# Patient Record
Sex: MALE | Race: WHITE | Employment: UNEMPLOYED | URBAN - METROPOLITAN AREA
[De-identification: names, ages, dates, MRNs, and addresses within clinical notes are randomized per-mention and may not be internally consistent; named-entity substitution may affect disease eponyms.]

---

## 2017-01-13 RX ORDER — MELOXICAM 15 MG/1
15 TABLET ORAL DAILY
Qty: 30 TABLET | Refills: 0 | Status: SHIPPED | OUTPATIENT
Start: 2017-01-13 | End: 2017-02-05 | Stop reason: SDUPTHER

## 2017-02-16 ENCOUNTER — TELEPHONE (OUTPATIENT)
Dept: FAMILY MEDICINE CLINIC | Age: 42
End: 2017-02-16

## 2017-02-24 ENCOUNTER — OFFICE VISIT (OUTPATIENT)
Dept: FAMILY MEDICINE CLINIC | Age: 42
End: 2017-02-24

## 2017-02-24 VITALS
BODY MASS INDEX: 31.5 KG/M2 | SYSTOLIC BLOOD PRESSURE: 112 MMHG | HEART RATE: 93 BPM | WEIGHT: 196 LBS | HEIGHT: 66 IN | OXYGEN SATURATION: 98 % | DIASTOLIC BLOOD PRESSURE: 68 MMHG

## 2017-02-24 DIAGNOSIS — R06.83 SNORING: ICD-10-CM

## 2017-02-24 DIAGNOSIS — M51.36 DISC DEGENERATION, LUMBAR: Primary | ICD-10-CM

## 2017-02-24 PROCEDURE — 99214 OFFICE O/P EST MOD 30 MIN: CPT | Performed by: FAMILY MEDICINE

## 2017-03-28 ENCOUNTER — TELEPHONE (OUTPATIENT)
Dept: FAMILY MEDICINE CLINIC | Age: 42
End: 2017-03-28

## 2017-05-03 RX ORDER — MELOXICAM 15 MG/1
15 TABLET ORAL DAILY
Qty: 30 TABLET | Refills: 0 | Status: SHIPPED | OUTPATIENT
Start: 2017-05-03 | End: 2017-06-05 | Stop reason: SDUPTHER

## 2017-05-11 ENCOUNTER — OFFICE VISIT (OUTPATIENT)
Dept: FAMILY MEDICINE CLINIC | Age: 42
End: 2017-05-11

## 2017-05-11 VITALS
HEART RATE: 95 BPM | DIASTOLIC BLOOD PRESSURE: 84 MMHG | OXYGEN SATURATION: 98 % | WEIGHT: 185.2 LBS | HEIGHT: 66 IN | SYSTOLIC BLOOD PRESSURE: 130 MMHG | BODY MASS INDEX: 29.77 KG/M2

## 2017-05-11 DIAGNOSIS — R42 DIZZINESS: ICD-10-CM

## 2017-05-11 DIAGNOSIS — E78.2 MIXED HYPERLIPIDEMIA: ICD-10-CM

## 2017-05-11 DIAGNOSIS — Z11.4 SCREENING FOR HIV (HUMAN IMMUNODEFICIENCY VIRUS): ICD-10-CM

## 2017-05-11 DIAGNOSIS — M51.36 DISC DEGENERATION, LUMBAR: Primary | ICD-10-CM

## 2017-05-11 PROCEDURE — 99214 OFFICE O/P EST MOD 30 MIN: CPT | Performed by: FAMILY MEDICINE

## 2017-05-11 RX ORDER — PREGABALIN 75 MG/1
75 CAPSULE ORAL 2 TIMES DAILY
Qty: 60 CAPSULE | Refills: 0 | Status: SHIPPED | OUTPATIENT
Start: 2017-05-11 | End: 2017-06-13 | Stop reason: SDUPTHER

## 2017-05-15 DIAGNOSIS — R42 DIZZINESS: ICD-10-CM

## 2017-05-15 DIAGNOSIS — E78.2 MIXED HYPERLIPIDEMIA: ICD-10-CM

## 2017-05-15 LAB
A/G RATIO: 2.4 (ref 1.1–2.2)
ALBUMIN SERPL-MCNC: 5 G/DL (ref 3.4–5)
ALP BLD-CCNC: 64 U/L (ref 40–129)
ALT SERPL-CCNC: 18 U/L (ref 10–40)
ANION GAP SERPL CALCULATED.3IONS-SCNC: 14 MMOL/L (ref 3–16)
AST SERPL-CCNC: 18 U/L (ref 15–37)
BILIRUB SERPL-MCNC: 0.4 MG/DL (ref 0–1)
BUN BLDV-MCNC: 12 MG/DL (ref 7–20)
CALCIUM SERPL-MCNC: 10.1 MG/DL (ref 8.3–10.6)
CHLORIDE BLD-SCNC: 101 MMOL/L (ref 99–110)
CHOLESTEROL, TOTAL: 211 MG/DL (ref 0–199)
CO2: 28 MMOL/L (ref 21–32)
CREAT SERPL-MCNC: 0.7 MG/DL (ref 0.9–1.3)
GFR AFRICAN AMERICAN: >60
GFR NON-AFRICAN AMERICAN: >60
GLOBULIN: 2.1 G/DL
GLUCOSE BLD-MCNC: 110 MG/DL (ref 70–99)
HCT VFR BLD CALC: 44.6 % (ref 40.5–52.5)
HDLC SERPL-MCNC: 42 MG/DL (ref 40–60)
HEMOGLOBIN: 14.7 G/DL (ref 13.5–17.5)
LDL CHOLESTEROL CALCULATED: 113 MG/DL
MCH RBC QN AUTO: 29.4 PG (ref 26–34)
MCHC RBC AUTO-ENTMCNC: 32.9 G/DL (ref 31–36)
MCV RBC AUTO: 89.3 FL (ref 80–100)
PDW BLD-RTO: 14.1 % (ref 12.4–15.4)
PLATELET # BLD: 251 K/UL (ref 135–450)
PMV BLD AUTO: 8.1 FL (ref 5–10.5)
POTASSIUM SERPL-SCNC: 4.4 MMOL/L (ref 3.5–5.1)
RBC # BLD: 4.99 M/UL (ref 4.2–5.9)
SODIUM BLD-SCNC: 143 MMOL/L (ref 136–145)
TOTAL PROTEIN: 7.1 G/DL (ref 6.4–8.2)
TRIGL SERPL-MCNC: 280 MG/DL (ref 0–150)
TSH REFLEX: 1.37 UIU/ML (ref 0.27–4.2)
VLDLC SERPL CALC-MCNC: 56 MG/DL
WBC # BLD: 4.7 K/UL (ref 4–11)

## 2017-05-16 ENCOUNTER — TELEPHONE (OUTPATIENT)
Dept: FAMILY MEDICINE CLINIC | Age: 42
End: 2017-05-16

## 2017-05-16 LAB
ESTIMATED AVERAGE GLUCOSE: 116.9 MG/DL
HBA1C MFR BLD: 5.7 %

## 2017-05-17 LAB — HIV-1 AND HIV-2 ANTIBODIES: NEGATIVE

## 2017-06-06 RX ORDER — MELOXICAM 15 MG/1
15 TABLET ORAL DAILY
Qty: 30 TABLET | Refills: 0 | Status: SHIPPED | OUTPATIENT
Start: 2017-06-06 | End: 2017-07-04 | Stop reason: SDUPTHER

## 2017-06-29 ENCOUNTER — TELEPHONE (OUTPATIENT)
Dept: PULMONOLOGY | Age: 42
End: 2017-06-29

## 2017-07-03 RX ORDER — CYCLOBENZAPRINE HCL 10 MG
TABLET ORAL
Qty: 30 TABLET | Refills: 0 | Status: SHIPPED | OUTPATIENT
Start: 2017-07-03 | End: 2017-08-01 | Stop reason: SDUPTHER

## 2017-07-26 ENCOUNTER — TELEPHONE (OUTPATIENT)
Dept: FAMILY MEDICINE CLINIC | Age: 42
End: 2017-07-26

## 2017-08-01 RX ORDER — CYCLOBENZAPRINE HCL 10 MG
10 TABLET ORAL NIGHTLY PRN
Qty: 30 TABLET | Refills: 0 | Status: SHIPPED | OUTPATIENT
Start: 2017-08-01 | End: 2017-10-10 | Stop reason: SDUPTHER

## 2017-08-30 ENCOUNTER — HOSPITAL ENCOUNTER (OUTPATIENT)
Dept: PHYSICAL THERAPY | Age: 42
Discharge: HOME | End: 2017-08-31
Attending: FAMILY MEDICINE | Admitting: FAMILY MEDICINE

## 2017-09-15 ENCOUNTER — OFFICE VISIT (OUTPATIENT)
Dept: FAMILY MEDICINE CLINIC | Age: 42
End: 2017-09-15

## 2017-09-15 VITALS
BODY MASS INDEX: 30.86 KG/M2 | HEART RATE: 107 BPM | SYSTOLIC BLOOD PRESSURE: 130 MMHG | OXYGEN SATURATION: 98 % | WEIGHT: 192 LBS | DIASTOLIC BLOOD PRESSURE: 80 MMHG | HEIGHT: 66 IN

## 2017-09-15 DIAGNOSIS — M51.36 DISC DEGENERATION, LUMBAR: Primary | ICD-10-CM

## 2017-09-15 DIAGNOSIS — F98.8 ADD (ATTENTION DEFICIT DISORDER): ICD-10-CM

## 2017-09-15 PROCEDURE — 99214 OFFICE O/P EST MOD 30 MIN: CPT | Performed by: FAMILY MEDICINE

## 2017-09-15 RX ORDER — DEXTROAMPHETAMINE SACCHARATE, AMPHETAMINE ASPARTATE, DEXTROAMPHETAMINE SULFATE AND AMPHETAMINE SULFATE 5; 5; 5; 5 MG/1; MG/1; MG/1; MG/1
20 TABLET ORAL DAILY
Qty: 30 TABLET | Refills: 0 | Status: SHIPPED | OUTPATIENT
Start: 2017-09-15 | End: 2017-10-10 | Stop reason: SDUPTHER

## 2017-09-23 PROBLEM — F98.8 ADD (ATTENTION DEFICIT DISORDER): Status: ACTIVE | Noted: 2017-09-23

## 2017-09-23 ASSESSMENT — ENCOUNTER SYMPTOMS: RESPIRATORY NEGATIVE: 1

## 2017-10-10 NOTE — TELEPHONE ENCOUNTER
Adderall - 9/15/17  Flexeril - 8/1/17  Meloxicam - 7/5/17  Last Office Visit 9/15/17  No Pending Appointments

## 2017-10-11 RX ORDER — CYCLOBENZAPRINE HCL 10 MG
10 TABLET ORAL NIGHTLY PRN
Qty: 30 TABLET | Refills: 0 | Status: SHIPPED | OUTPATIENT
Start: 2017-10-11 | End: 2017-12-28 | Stop reason: SDUPTHER

## 2017-10-11 RX ORDER — DEXTROAMPHETAMINE SACCHARATE, AMPHETAMINE ASPARTATE, DEXTROAMPHETAMINE SULFATE AND AMPHETAMINE SULFATE 5; 5; 5; 5 MG/1; MG/1; MG/1; MG/1
20 TABLET ORAL DAILY
Qty: 30 TABLET | Refills: 0 | Status: SHIPPED | OUTPATIENT
Start: 2017-10-11 | End: 2017-11-13 | Stop reason: SDUPTHER

## 2017-10-11 RX ORDER — MELOXICAM 15 MG/1
15 TABLET ORAL DAILY
Qty: 30 TABLET | Refills: 2 | Status: SHIPPED | OUTPATIENT
Start: 2017-10-11 | End: 2017-11-05 | Stop reason: SDUPTHER

## 2017-11-06 RX ORDER — MELOXICAM 15 MG/1
15 TABLET ORAL DAILY
Qty: 90 TABLET | Refills: 2 | Status: SHIPPED | OUTPATIENT
Start: 2017-11-06 | End: 2018-03-22

## 2017-11-14 RX ORDER — DEXTROAMPHETAMINE SACCHARATE, AMPHETAMINE ASPARTATE, DEXTROAMPHETAMINE SULFATE AND AMPHETAMINE SULFATE 5; 5; 5; 5 MG/1; MG/1; MG/1; MG/1
20 TABLET ORAL DAILY
Qty: 30 TABLET | Refills: 0 | Status: SHIPPED | OUTPATIENT
Start: 2017-11-14 | End: 2017-12-28 | Stop reason: SDUPTHER

## 2017-12-28 RX ORDER — CYCLOBENZAPRINE HCL 10 MG
TABLET ORAL
Qty: 30 TABLET | Refills: 0 | Status: SHIPPED | OUTPATIENT
Start: 2017-12-28 | End: 2018-01-17

## 2017-12-28 RX ORDER — DEXTROAMPHETAMINE SACCHARATE, AMPHETAMINE ASPARTATE, DEXTROAMPHETAMINE SULFATE AND AMPHETAMINE SULFATE 5; 5; 5; 5 MG/1; MG/1; MG/1; MG/1
20 TABLET ORAL DAILY
Qty: 30 TABLET | Refills: 0 | Status: SHIPPED | OUTPATIENT
Start: 2017-12-28 | End: 2018-01-17

## 2018-01-17 ENCOUNTER — OFFICE VISIT (OUTPATIENT)
Dept: FAMILY MEDICINE CLINIC | Age: 43
End: 2018-01-17

## 2018-01-17 VITALS
HEIGHT: 66 IN | DIASTOLIC BLOOD PRESSURE: 84 MMHG | SYSTOLIC BLOOD PRESSURE: 128 MMHG | WEIGHT: 196.8 LBS | OXYGEN SATURATION: 98 % | HEART RATE: 93 BPM | BODY MASS INDEX: 31.63 KG/M2

## 2018-01-17 DIAGNOSIS — M51.36 DISC DEGENERATION, LUMBAR: ICD-10-CM

## 2018-01-17 DIAGNOSIS — F98.8 ATTENTION DEFICIT DISORDER, UNSPECIFIED HYPERACTIVITY PRESENCE: ICD-10-CM

## 2018-01-17 DIAGNOSIS — K27.9 H PYLORI ULCER: Primary | ICD-10-CM

## 2018-01-17 DIAGNOSIS — B96.81 H PYLORI ULCER: Primary | ICD-10-CM

## 2018-01-17 PROCEDURE — 99214 OFFICE O/P EST MOD 30 MIN: CPT | Performed by: FAMILY MEDICINE

## 2018-01-17 RX ORDER — DULOXETIN HYDROCHLORIDE 30 MG/1
30 CAPSULE, DELAYED RELEASE ORAL DAILY
Qty: 30 CAPSULE | Refills: 3 | Status: SHIPPED | OUTPATIENT
Start: 2018-01-17 | End: 2019-03-06

## 2018-01-17 RX ORDER — METHYLPHENIDATE HYDROCHLORIDE 27 MG/1
27 TABLET ORAL DAILY
Qty: 30 TABLET | Refills: 0 | Status: SHIPPED | OUTPATIENT
Start: 2018-01-17 | End: 2018-02-21

## 2018-01-17 RX ORDER — BACLOFEN 20 MG/1
20 TABLET ORAL NIGHTLY PRN
Qty: 30 TABLET | Refills: 0 | Status: SHIPPED | OUTPATIENT
Start: 2018-01-17 | End: 2018-02-10 | Stop reason: SDUPTHER

## 2018-01-17 RX ORDER — PANTOPRAZOLE SODIUM 40 MG/1
40 TABLET, DELAYED RELEASE ORAL DAILY
Qty: 30 TABLET | Refills: 2 | Status: SHIPPED | OUTPATIENT
Start: 2018-01-17 | End: 2018-04-15 | Stop reason: SDUPTHER

## 2018-01-17 NOTE — PROGRESS NOTES
Subjective:      Patient ID: Annie Jackson is a 36 y.o. male. HPI   Pt is a of 36 y.o. male comes in today with   Chief Complaint   Patient presents with    Medication Refill    Follow-up     Patient is here to f/u on back pain. Here for follow up back pain. Still having numbness in left toe. If he forgets meloxicam has severe back pain. Flexeril not really helping. Neck always painful. Flared a few weeks ago but back to baseline. 95% of the time it helps  In September started adderall. 1st few weeks didn't help but after that helped with energy but not focus. Recurrent acid reflux  Was a problem in the past.  Watches diet. Has woke up choking due to this. H/o acid reflux in the past.    Past Medical History:Reviewed  Medications:Reviewed. No Known Allergies   Social hx:Reviewed. History   Smoking Status    Never Smoker   Smokeless Tobacco    Never Used     Review of Systems   Not wt change. No dysthymia  Vitals:    01/17/18 0936   BP: 128/84   Pulse: 93   SpO2: 98%      Objective:   Physical Exam    Assessment:      1. H pylori ulcer  H. PYLORI ANTIGEN, STOOL   2. Attention deficit disorder, unspecified hyperactivity presence     3. Disc degeneration, lumbar            Plan:    1. Restarting PPI and stool test  2. Not doing well so trial of alternate med  3. Not well controlled   Replace flexeril with baclofen  If cymbalta well tolerated after a few weeks could increase to 2 lyrica at night. If that helps and no side effects can then go to 2 lyrica twice daily. A new prescription for a higher dose can be called in in that case.   Will try chiropractic

## 2018-01-17 NOTE — PATIENT INSTRUCTIONS
Replace flexeril with baclofen  If cymbalta well tolerated after a few weeks could increase to 2 lyrica at night. If that helps and no side effects can then go to 2 lyrica twice daily.   A new prescription for a higher dose can be called in in that case

## 2018-01-22 ENCOUNTER — TELEPHONE (OUTPATIENT)
Dept: FAMILY MEDICINE CLINIC | Age: 43
End: 2018-01-22

## 2018-01-22 NOTE — TELEPHONE ENCOUNTER
Pt called to check on the status of his FIT test that he dropped off at [facility] [phone].   Pt hasn't heard anything back from the hospital.

## 2018-02-12 RX ORDER — BACLOFEN 20 MG/1
TABLET ORAL
Qty: 30 TABLET | Refills: 0 | Status: SHIPPED | OUTPATIENT
Start: 2018-02-12 | End: 2018-02-21 | Stop reason: SDUPTHER

## 2018-02-21 ENCOUNTER — OFFICE VISIT (OUTPATIENT)
Dept: FAMILY MEDICINE CLINIC | Age: 43
End: 2018-02-21

## 2018-02-21 VITALS
HEIGHT: 66 IN | HEART RATE: 86 BPM | OXYGEN SATURATION: 98 % | SYSTOLIC BLOOD PRESSURE: 100 MMHG | DIASTOLIC BLOOD PRESSURE: 70 MMHG | BODY MASS INDEX: 30.22 KG/M2 | WEIGHT: 188 LBS

## 2018-02-21 DIAGNOSIS — E78.2 MIXED HYPERLIPIDEMIA: ICD-10-CM

## 2018-02-21 DIAGNOSIS — R73.9 ELEVATED BLOOD SUGAR: ICD-10-CM

## 2018-02-21 DIAGNOSIS — M25.50 ARTHRALGIA, UNSPECIFIED JOINT: ICD-10-CM

## 2018-02-21 DIAGNOSIS — R20.0 NUMBNESS OF TOES: ICD-10-CM

## 2018-02-21 DIAGNOSIS — M51.36 DISC DEGENERATION, LUMBAR: ICD-10-CM

## 2018-02-21 DIAGNOSIS — F98.8 ATTENTION DEFICIT DISORDER, UNSPECIFIED HYPERACTIVITY PRESENCE: Primary | ICD-10-CM

## 2018-02-21 PROCEDURE — 99214 OFFICE O/P EST MOD 30 MIN: CPT | Performed by: FAMILY MEDICINE

## 2018-02-21 RX ORDER — METHYLPHENIDATE HYDROCHLORIDE 36 MG/1
36 TABLET ORAL DAILY
Qty: 30 TABLET | Refills: 0 | Status: SHIPPED | OUTPATIENT
Start: 2018-02-21 | End: 2018-03-23 | Stop reason: SDUPTHER

## 2018-02-21 RX ORDER — PREGABALIN 75 MG/1
75 CAPSULE ORAL 3 TIMES DAILY
Qty: 90 CAPSULE | Refills: 2 | Status: SHIPPED | OUTPATIENT
Start: 2018-02-21 | End: 2018-03-22

## 2018-02-21 RX ORDER — BACLOFEN 20 MG/1
TABLET ORAL
Qty: 30 TABLET | Refills: 2 | Status: SHIPPED | OUTPATIENT
Start: 2018-02-21 | End: 2019-02-14

## 2018-02-21 ASSESSMENT — ENCOUNTER SYMPTOMS: RESPIRATORY NEGATIVE: 1

## 2018-02-21 NOTE — PROGRESS NOTES
IgG; Future  -     C-Reactive Protein Inflammatory; Future  -     Sedimentation Rate; Future  bloodwork to evaluate  Mixed hyperlipidemia  -     Comprehensive Metabolic Panel; Future  -     Lipid Panel; Future  bloodwork to reevaluate  Elevated blood sugar  -     Hemoglobin A1C; Future  a1c to see if stalbe  Other orders  -     methylphenidate (CONCERTA) 36 MG extended release tablet; Take 1 tablet by mouth daily for 30 days. -     pregabalin (LYRICA) 75 MG capsule; Take 1 capsule by mouth 3 times daily for 90 days.   -     baclofen (LIORESAL) 20 MG tablet; TAKE 1 TABLET BY MOUTH EVERY NIGHT AS NEEDED FOR BACK SPASM

## 2018-02-22 DIAGNOSIS — E78.2 MIXED HYPERLIPIDEMIA: ICD-10-CM

## 2018-02-22 DIAGNOSIS — M25.50 ARTHRALGIA, UNSPECIFIED JOINT: ICD-10-CM

## 2018-02-22 DIAGNOSIS — R73.9 ELEVATED BLOOD SUGAR: ICD-10-CM

## 2018-02-22 LAB
A/G RATIO: 2.4 (ref 1.1–2.2)
ALBUMIN SERPL-MCNC: 5 G/DL (ref 3.4–5)
ALP BLD-CCNC: 64 U/L (ref 40–129)
ALT SERPL-CCNC: 20 U/L (ref 10–40)
ANION GAP SERPL CALCULATED.3IONS-SCNC: 10 MMOL/L (ref 3–16)
AST SERPL-CCNC: 24 U/L (ref 15–37)
BILIRUB SERPL-MCNC: 0.6 MG/DL (ref 0–1)
BUN BLDV-MCNC: 12 MG/DL (ref 7–20)
C-REACTIVE PROTEIN: 2.4 MG/L (ref 0–5.1)
CALCIUM SERPL-MCNC: 10.1 MG/DL (ref 8.3–10.6)
CHLORIDE BLD-SCNC: 103 MMOL/L (ref 99–110)
CHOLESTEROL, TOTAL: 187 MG/DL (ref 0–199)
CO2: 31 MMOL/L (ref 21–32)
CREAT SERPL-MCNC: 0.9 MG/DL (ref 0.9–1.3)
ESTIMATED AVERAGE GLUCOSE: 111.2 MG/DL
GFR AFRICAN AMERICAN: >60
GFR NON-AFRICAN AMERICAN: >60
GLOBULIN: 2.1 G/DL
GLUCOSE BLD-MCNC: 118 MG/DL (ref 70–99)
HBA1C MFR BLD: 5.5 %
HDLC SERPL-MCNC: 56 MG/DL (ref 40–60)
LDL CHOLESTEROL CALCULATED: 117 MG/DL
POTASSIUM SERPL-SCNC: 5.2 MMOL/L (ref 3.5–5.1)
RHEUMATOID FACTOR: <10 IU/ML
SEDIMENTATION RATE, ERYTHROCYTE: 5 MM/HR (ref 0–15)
SODIUM BLD-SCNC: 144 MMOL/L (ref 136–145)
TOTAL PROTEIN: 7.1 G/DL (ref 6.4–8.2)
TRIGL SERPL-MCNC: 72 MG/DL (ref 0–150)
URIC ACID, SERUM: 7.3 MG/DL (ref 3.5–7.2)
VLDLC SERPL CALC-MCNC: 14 MG/DL

## 2018-02-24 LAB — CCP IGG ANTIBODIES: 6 UNITS (ref 0–19)

## 2018-03-22 ENCOUNTER — OFFICE VISIT (OUTPATIENT)
Dept: FAMILY MEDICINE CLINIC | Age: 43
End: 2018-03-22

## 2018-03-22 VITALS
HEART RATE: 118 BPM | WEIGHT: 191 LBS | SYSTOLIC BLOOD PRESSURE: 130 MMHG | OXYGEN SATURATION: 98 % | BODY MASS INDEX: 30.7 KG/M2 | HEIGHT: 66 IN | DIASTOLIC BLOOD PRESSURE: 84 MMHG

## 2018-03-22 DIAGNOSIS — M51.36 DISC DEGENERATION, LUMBAR: ICD-10-CM

## 2018-03-22 DIAGNOSIS — K21.00 GERD WITH ESOPHAGITIS: ICD-10-CM

## 2018-03-22 DIAGNOSIS — R06.83 SNORING: Primary | ICD-10-CM

## 2018-03-22 DIAGNOSIS — F98.8 ATTENTION DEFICIT DISORDER, UNSPECIFIED HYPERACTIVITY PRESENCE: ICD-10-CM

## 2018-03-22 PROCEDURE — 99214 OFFICE O/P EST MOD 30 MIN: CPT | Performed by: FAMILY MEDICINE

## 2018-03-22 RX ORDER — CELECOXIB 200 MG/1
200 CAPSULE ORAL 2 TIMES DAILY
Qty: 60 CAPSULE | Refills: 2 | Status: SHIPPED | OUTPATIENT
Start: 2018-03-22 | End: 2019-03-06

## 2018-03-23 RX ORDER — METHYLPHENIDATE HYDROCHLORIDE 36 MG/1
36 TABLET ORAL DAILY
Qty: 30 TABLET | Refills: 0 | Status: SHIPPED | OUTPATIENT
Start: 2018-03-23 | End: 2019-03-06

## 2018-03-23 NOTE — TELEPHONE ENCOUNTER
Concerta refill needed. Send to [address] [facility] in [place] on [address].

## 2018-03-23 NOTE — TELEPHONE ENCOUNTER
Pt called back to say he is totally out of meds and would like to know if they could fill the Rx today.

## 2018-03-26 ASSESSMENT — ENCOUNTER SYMPTOMS: GASTROINTESTINAL NEGATIVE: 1

## 2018-03-28 ENCOUNTER — TELEPHONE (OUTPATIENT)
Dept: FAMILY MEDICINE CLINIC | Age: 43
End: 2018-03-28

## 2018-04-16 RX ORDER — PANTOPRAZOLE SODIUM 40 MG/1
40 TABLET, DELAYED RELEASE ORAL DAILY
Qty: 30 TABLET | Refills: 2 | Status: SHIPPED | OUTPATIENT
Start: 2018-04-16 | End: 2018-09-08 | Stop reason: SDUPTHER

## 2018-09-10 RX ORDER — PANTOPRAZOLE SODIUM 40 MG/1
40 TABLET, DELAYED RELEASE ORAL DAILY
Qty: 30 TABLET | Refills: 3 | Status: SHIPPED | OUTPATIENT
Start: 2018-09-10 | End: 2019-03-06

## 2019-02-14 ENCOUNTER — OFFICE VISIT (OUTPATIENT)
Dept: FAMILY MEDICINE CLINIC | Age: 44
End: 2019-02-14
Payer: COMMERCIAL

## 2019-02-14 VITALS
WEIGHT: 198.2 LBS | DIASTOLIC BLOOD PRESSURE: 86 MMHG | SYSTOLIC BLOOD PRESSURE: 120 MMHG | BODY MASS INDEX: 31.85 KG/M2 | HEIGHT: 66 IN | HEART RATE: 84 BPM | OXYGEN SATURATION: 98 %

## 2019-02-14 DIAGNOSIS — R51.9 ACUTE INTRACTABLE HEADACHE, UNSPECIFIED HEADACHE TYPE: ICD-10-CM

## 2019-02-14 DIAGNOSIS — Z00.00 WELL ADULT EXAM: ICD-10-CM

## 2019-02-14 DIAGNOSIS — R06.83 SNORING: ICD-10-CM

## 2019-02-14 DIAGNOSIS — Z00.00 WELL ADULT EXAM: Primary | ICD-10-CM

## 2019-02-14 DIAGNOSIS — R73.03 PREDIABETES: ICD-10-CM

## 2019-02-14 DIAGNOSIS — M51.36 DISC DEGENERATION, LUMBAR: ICD-10-CM

## 2019-02-14 LAB
A/G RATIO: 2.1 (ref 1.1–2.2)
ALBUMIN SERPL-MCNC: 5 G/DL (ref 3.4–5)
ALP BLD-CCNC: 70 U/L (ref 40–129)
ALT SERPL-CCNC: 28 U/L (ref 10–40)
ANION GAP SERPL CALCULATED.3IONS-SCNC: 15 MMOL/L (ref 3–16)
AST SERPL-CCNC: 28 U/L (ref 15–37)
BILIRUB SERPL-MCNC: 0.6 MG/DL (ref 0–1)
BUN BLDV-MCNC: 12 MG/DL (ref 7–20)
CALCIUM SERPL-MCNC: 10.3 MG/DL (ref 8.3–10.6)
CHLORIDE BLD-SCNC: 105 MMOL/L (ref 99–110)
CHOLESTEROL, TOTAL: 226 MG/DL (ref 0–199)
CO2: 27 MMOL/L (ref 21–32)
CREAT SERPL-MCNC: 0.9 MG/DL (ref 0.9–1.3)
GFR AFRICAN AMERICAN: >60
GFR NON-AFRICAN AMERICAN: >60
GLOBULIN: 2.4 G/DL
GLUCOSE BLD-MCNC: 107 MG/DL (ref 70–99)
HDLC SERPL-MCNC: 56 MG/DL (ref 40–60)
LDL CHOLESTEROL CALCULATED: 145 MG/DL
POTASSIUM SERPL-SCNC: 4.7 MMOL/L (ref 3.5–5.1)
SODIUM BLD-SCNC: 147 MMOL/L (ref 136–145)
TOTAL PROTEIN: 7.4 G/DL (ref 6.4–8.2)
TRIGL SERPL-MCNC: 124 MG/DL (ref 0–150)
VLDLC SERPL CALC-MCNC: 25 MG/DL

## 2019-02-14 PROCEDURE — 99396 PREV VISIT EST AGE 40-64: CPT | Performed by: FAMILY MEDICINE

## 2019-02-14 ASSESSMENT — PATIENT HEALTH QUESTIONNAIRE - PHQ9
1. LITTLE INTEREST OR PLEASURE IN DOING THINGS: 0
SUM OF ALL RESPONSES TO PHQ QUESTIONS 1-9: 0
SUM OF ALL RESPONSES TO PHQ QUESTIONS 1-9: 0
2. FEELING DOWN, DEPRESSED OR HOPELESS: 0
SUM OF ALL RESPONSES TO PHQ9 QUESTIONS 1 & 2: 0

## 2019-02-15 ENCOUNTER — TELEPHONE (OUTPATIENT)
Dept: FAMILY MEDICINE CLINIC | Age: 44
End: 2019-02-15

## 2019-02-15 LAB
ESTIMATED AVERAGE GLUCOSE: 116.9 MG/DL
HBA1C MFR BLD: 5.7 %

## 2019-03-06 ENCOUNTER — OFFICE VISIT (OUTPATIENT)
Dept: FAMILY MEDICINE CLINIC | Age: 44
End: 2019-03-06
Payer: COMMERCIAL

## 2019-03-06 VITALS
HEIGHT: 66 IN | RESPIRATION RATE: 12 BRPM | SYSTOLIC BLOOD PRESSURE: 124 MMHG | OXYGEN SATURATION: 97 % | BODY MASS INDEX: 31.76 KG/M2 | WEIGHT: 197.6 LBS | DIASTOLIC BLOOD PRESSURE: 84 MMHG | HEART RATE: 87 BPM

## 2019-03-06 DIAGNOSIS — M51.36 DISC DEGENERATION, LUMBAR: ICD-10-CM

## 2019-03-06 DIAGNOSIS — J39.2 THORNWALDT'S CYST: ICD-10-CM

## 2019-03-06 DIAGNOSIS — M10.9 GOUTY ARTHRITIS OF RIGHT GREAT TOE: Primary | Chronic | ICD-10-CM

## 2019-03-06 PROCEDURE — 99214 OFFICE O/P EST MOD 30 MIN: CPT | Performed by: FAMILY MEDICINE

## 2019-03-06 RX ORDER — COLCHICINE 0.6 MG/1
TABLET ORAL
Qty: 30 TABLET | Refills: 3 | Status: SHIPPED | OUTPATIENT
Start: 2019-03-06 | End: 2019-05-11 | Stop reason: SDUPTHER

## 2019-03-07 ENCOUNTER — TELEPHONE (OUTPATIENT)
Dept: ENT CLINIC | Age: 44
End: 2019-03-07

## 2019-03-07 ENCOUNTER — OFFICE VISIT (OUTPATIENT)
Dept: ENT CLINIC | Age: 44
End: 2019-03-07
Payer: COMMERCIAL

## 2019-03-07 VITALS — SYSTOLIC BLOOD PRESSURE: 137 MMHG | HEART RATE: 81 BPM | DIASTOLIC BLOOD PRESSURE: 94 MMHG

## 2019-03-07 DIAGNOSIS — J34.3 NASAL TURBINATE HYPERTROPHY: ICD-10-CM

## 2019-03-07 DIAGNOSIS — J34.89 NASAL OBSTRUCTION: ICD-10-CM

## 2019-03-07 DIAGNOSIS — J39.2 THORNWALDT'S CYST: Primary | ICD-10-CM

## 2019-03-07 DIAGNOSIS — J34.2 DEVIATED NASAL SEPTUM: ICD-10-CM

## 2019-03-07 DIAGNOSIS — K21.9 GASTROESOPHAGEAL REFLUX DISEASE, ESOPHAGITIS PRESENCE NOT SPECIFIED: ICD-10-CM

## 2019-03-07 PROCEDURE — 92511 NASOPHARYNGOSCOPY: CPT | Performed by: OTOLARYNGOLOGY

## 2019-03-07 PROCEDURE — 99214 OFFICE O/P EST MOD 30 MIN: CPT | Performed by: OTOLARYNGOLOGY

## 2019-03-07 RX ORDER — MOMETASONE FUROATE 50 UG/1
2 SPRAY, METERED NASAL DAILY
Qty: 1 INHALER | Refills: 3 | Status: SHIPPED | OUTPATIENT
Start: 2019-03-07 | End: 2019-03-07

## 2019-03-07 RX ORDER — AZELASTINE 1 MG/ML
2 SPRAY, METERED NASAL 2 TIMES DAILY
Qty: 4 BOTTLE | Refills: 1 | Status: SHIPPED | OUTPATIENT
Start: 2019-03-07 | End: 2019-03-07

## 2019-03-07 ASSESSMENT — ENCOUNTER SYMPTOMS
SINUS PRESSURE: 0
DIARRHEA: 0
SINUS PAIN: 0
COUGH: 0
CHEST TIGHTNESS: 0
VOICE CHANGE: 0
SORE THROAT: 0
RHINORRHEA: 0
CHOKING: 0
FACIAL SWELLING: 0
CONSTIPATION: 0
APNEA: 0
WHEEZING: 0
BACK PAIN: 0
SHORTNESS OF BREATH: 0
EYE DISCHARGE: 0
NAUSEA: 0
TROUBLE SWALLOWING: 0
VOMITING: 0
BLOOD IN STOOL: 0
EYE PAIN: 0
STRIDOR: 0
COLOR CHANGE: 0

## 2019-05-13 RX ORDER — COLCHICINE 0.6 MG/1
TABLET ORAL
Qty: 30 TABLET | Refills: 0 | Status: SHIPPED | OUTPATIENT
Start: 2019-05-13 | End: 2019-05-18 | Stop reason: SDUPTHER

## 2019-05-13 NOTE — TELEPHONE ENCOUNTER
Requested Prescriptions     Pending Prescriptions Disp Refills    colchicine (COLCRYS) 0.6 MG tablet [Pharmacy Med Name: COLCRYS 0.6MG TABLETS] 30 tablet 0     Sig: TAKE 2 TABLETS BY MOUTH ONCE THEN TAKE 1 TABLET BY MOUTH 1 HOUR LATER, THEN TAKE 1 TABLET BY MOUTH TWICE DAILY AS NEEDED FOR GOUT     LV: 03/6/2019  NV: none scheduled

## 2019-05-20 RX ORDER — COLCHICINE 0.6 MG/1
TABLET ORAL
Qty: 30 TABLET | Refills: 0 | Status: SHIPPED | OUTPATIENT
Start: 2019-05-20 | End: 2019-10-24 | Stop reason: SDUPTHER

## 2019-07-10 ENCOUNTER — TELEPHONE (OUTPATIENT)
Dept: FAMILY MEDICINE CLINIC | Age: 44
End: 2019-07-10

## 2019-07-10 ENCOUNTER — OFFICE VISIT (OUTPATIENT)
Dept: FAMILY MEDICINE CLINIC | Age: 44
End: 2019-07-10
Payer: COMMERCIAL

## 2019-07-10 VITALS
OXYGEN SATURATION: 99 % | HEART RATE: 90 BPM | DIASTOLIC BLOOD PRESSURE: 72 MMHG | SYSTOLIC BLOOD PRESSURE: 112 MMHG | HEIGHT: 66 IN | WEIGHT: 196.8 LBS | RESPIRATION RATE: 12 BRPM | BODY MASS INDEX: 31.63 KG/M2

## 2019-07-10 DIAGNOSIS — Z99.89 OSA ON CPAP: ICD-10-CM

## 2019-07-10 DIAGNOSIS — R53.83 FATIGUE, UNSPECIFIED TYPE: Primary | ICD-10-CM

## 2019-07-10 DIAGNOSIS — G47.33 OSA ON CPAP: ICD-10-CM

## 2019-07-10 DIAGNOSIS — R53.83 FATIGUE, UNSPECIFIED TYPE: ICD-10-CM

## 2019-07-10 LAB
HCT VFR BLD CALC: 46 % (ref 40.5–52.5)
HEMOGLOBIN: 15.3 G/DL (ref 13.5–17.5)
MCH RBC QN AUTO: 30.5 PG (ref 26–34)
MCHC RBC AUTO-ENTMCNC: 33.1 G/DL (ref 31–36)
MCV RBC AUTO: 91.9 FL (ref 80–100)
PDW BLD-RTO: 14.3 % (ref 12.4–15.4)
PLATELET # BLD: 283 K/UL (ref 135–450)
PMV BLD AUTO: 8.4 FL (ref 5–10.5)
RBC # BLD: 5.01 M/UL (ref 4.2–5.9)
TSH REFLEX: 1.8 UIU/ML (ref 0.27–4.2)
WBC # BLD: 7.8 K/UL (ref 4–11)

## 2019-07-10 PROCEDURE — 99213 OFFICE O/P EST LOW 20 MIN: CPT | Performed by: FAMILY MEDICINE

## 2019-07-10 RX ORDER — BACLOFEN 20 MG/1
20 TABLET ORAL NIGHTLY
Qty: 10 TABLET | Refills: 0 | Status: SHIPPED | OUTPATIENT
Start: 2019-07-10 | End: 2019-07-16

## 2019-07-16 RX ORDER — CYCLOBENZAPRINE HCL 10 MG
10 TABLET ORAL NIGHTLY PRN
Qty: 10 TABLET | Refills: 0 | Status: SHIPPED | OUTPATIENT
Start: 2019-07-16 | End: 2019-07-26

## 2019-07-16 NOTE — TELEPHONE ENCOUNTER
Last OV 7/10/19    Plan:   1. bloodwork to evaluate  Can retry muscle relaxant hs; hopefully will help decrease neck pain so he can sleep  If not effective consider flexeril or TCA  2. Compliant.  follow up with pulmonology as scheduled

## 2019-07-16 NOTE — TELEPHONE ENCOUNTER
I sent over a few flexeril. If that does not help we'll try elavil.   Blood counts and thyroid were normal

## 2019-07-18 ENCOUNTER — TELEPHONE (OUTPATIENT)
Dept: FAMILY MEDICINE CLINIC | Age: 44
End: 2019-07-18

## 2019-07-18 RX ORDER — AMITRIPTYLINE HYDROCHLORIDE 50 MG/1
50 TABLET, FILM COATED ORAL NIGHTLY
Qty: 30 TABLET | Refills: 0 | Status: SHIPPED | OUTPATIENT
Start: 2019-07-18 | End: 2019-07-26 | Stop reason: SINTOL

## 2019-07-18 NOTE — TELEPHONE ENCOUNTER
Regarding the cyclobenzaprine (FLEXERIL) 10 MG tablets, patient wanted to let us know that it is now working. Please call patient to discuss.

## 2019-07-19 ENCOUNTER — OFFICE VISIT (OUTPATIENT)
Dept: FAMILY MEDICINE CLINIC | Age: 44
End: 2019-07-19
Payer: COMMERCIAL

## 2019-07-19 VITALS
DIASTOLIC BLOOD PRESSURE: 72 MMHG | WEIGHT: 198 LBS | BODY MASS INDEX: 31.82 KG/M2 | HEART RATE: 106 BPM | RESPIRATION RATE: 12 BRPM | HEIGHT: 66 IN | SYSTOLIC BLOOD PRESSURE: 112 MMHG | OXYGEN SATURATION: 98 %

## 2019-07-19 DIAGNOSIS — M50.30 OTHER CERVICAL DISC DEGENERATION, UNSPECIFIED CERVICAL REGION: Primary | ICD-10-CM

## 2019-07-19 PROCEDURE — 99213 OFFICE O/P EST LOW 20 MIN: CPT | Performed by: FAMILY MEDICINE

## 2019-07-19 NOTE — PROGRESS NOTES
Subjective:      Patient ID: Dirk Harris is a 38 y.o. male. HPI   Pt is a of 38 y.o. male comes in today with   Chief Complaint   Patient presents with    Neck Pain     Doing chiropractic and not getting better. Helps temporarily. Recent MRI showed worsening cervical disc disease  Low back pain as well but neck supercedes that. Injections have not helped in the past.  physical therapy was making it worse. Compliant with cpap but not sleeping since he keeps waking up. Has not tried elavil yet. Flexeril and baclofen did not help    Headaches have improved however. No Known Allergies    Review of Systems   Constitutional: Negative. Objective:   Physical Exam   Constitutional: He is oriented to person, place, and time. He appears well-developed and well-nourished. No distress. HENT:   Head: Normocephalic and atraumatic. Eyes: No scleral icterus. Neurological: He is alert and oriented to person, place, and time. No cranial nerve deficit. Skin: Skin is warm and dry. He is not diaphoretic. Psychiatric: He has a normal mood and affect. His behavior is normal. Judgment and thought content normal.       Assessment:       Diagnosis Orders   1.  Other cervical disc degeneration, unspecified cervical region  Martha Nobles MD, Spine Surgery, [facility] 46:      Pain not well controlled  If tca does not help can try lunesta for sleep since compliant with cpap  Referred to evaluate for other pain relief modalities since declines request to repeat physical therapy         Connor Ferrari MD

## 2019-07-26 ENCOUNTER — TELEPHONE (OUTPATIENT)
Dept: FAMILY MEDICINE CLINIC | Age: 44
End: 2019-07-26

## 2019-07-26 DIAGNOSIS — Z99.89 OSA ON CPAP: Primary | ICD-10-CM

## 2019-07-26 DIAGNOSIS — G47.9 DIFFICULTY SLEEPING: ICD-10-CM

## 2019-07-26 DIAGNOSIS — G47.33 OSA ON CPAP: Primary | ICD-10-CM

## 2019-07-26 RX ORDER — ESZOPICLONE 1 MG/1
1 TABLET, FILM COATED ORAL NIGHTLY
Qty: 30 TABLET | Refills: 0 | Status: SHIPPED | OUTPATIENT
Start: 2019-07-26 | End: 2019-08-25

## 2019-07-30 ENCOUNTER — OFFICE VISIT (OUTPATIENT)
Dept: FAMILY MEDICINE CLINIC | Age: 44
End: 2019-07-30
Payer: COMMERCIAL

## 2019-07-30 VITALS
SYSTOLIC BLOOD PRESSURE: 126 MMHG | HEART RATE: 57 BPM | BODY MASS INDEX: 32.14 KG/M2 | HEIGHT: 66 IN | WEIGHT: 200 LBS | DIASTOLIC BLOOD PRESSURE: 88 MMHG | OXYGEN SATURATION: 99 %

## 2019-07-30 DIAGNOSIS — M50.30 OTHER CERVICAL DISC DEGENERATION, UNSPECIFIED CERVICAL REGION: Primary | ICD-10-CM

## 2019-07-30 DIAGNOSIS — Z99.89 OSA ON CPAP: ICD-10-CM

## 2019-07-30 DIAGNOSIS — G47.9 DIFFICULTY SLEEPING: ICD-10-CM

## 2019-07-30 DIAGNOSIS — M51.36 DISC DEGENERATION, LUMBAR: ICD-10-CM

## 2019-07-30 DIAGNOSIS — R41.840 DIFFICULTY CONCENTRATING: ICD-10-CM

## 2019-07-30 DIAGNOSIS — G47.33 OSA ON CPAP: ICD-10-CM

## 2019-07-30 PROCEDURE — 99214 OFFICE O/P EST MOD 30 MIN: CPT | Performed by: NURSE PRACTITIONER

## 2019-07-30 ASSESSMENT — ENCOUNTER SYMPTOMS
SHORTNESS OF BREATH: 0
ABDOMINAL PAIN: 0
CONSTIPATION: 0
BACK PAIN: 1
DIARRHEA: 0

## 2019-08-05 ENCOUNTER — OFFICE VISIT (OUTPATIENT)
Dept: ORTHOPEDIC SURGERY | Age: 44
End: 2019-08-05
Payer: COMMERCIAL

## 2019-08-05 VITALS
DIASTOLIC BLOOD PRESSURE: 83 MMHG | SYSTOLIC BLOOD PRESSURE: 117 MMHG | WEIGHT: 190 LBS | BODY MASS INDEX: 29.82 KG/M2 | RESPIRATION RATE: 16 BRPM | HEART RATE: 82 BPM | HEIGHT: 67 IN

## 2019-08-05 DIAGNOSIS — G89.4 CHRONIC PAIN SYNDROME: ICD-10-CM

## 2019-08-05 DIAGNOSIS — M47.812 CERVICAL SPONDYLOSIS WITHOUT MYELOPATHY: ICD-10-CM

## 2019-08-05 DIAGNOSIS — M54.12 CERVICAL RADICULOPATHY: ICD-10-CM

## 2019-08-05 DIAGNOSIS — M50.00 HNP (HERNIATED NUCLEUS PULPOSUS) WITH MYELOPATHY, CERVICAL: Primary | ICD-10-CM

## 2019-08-05 PROBLEM — G25.81 RESTLESS LEGS SYNDROME (RLS): Status: ACTIVE | Noted: 2019-02-15

## 2019-08-05 PROBLEM — G47.33 OBSTRUCTIVE SLEEP APNEA (ADULT) (PEDIATRIC): Status: ACTIVE | Noted: 2019-08-05

## 2019-08-05 PROBLEM — R06.83 SNORING: Status: ACTIVE | Noted: 2019-02-15

## 2019-08-05 PROCEDURE — 99244 OFF/OP CNSLTJ NEW/EST MOD 40: CPT | Performed by: PHYSICIAN ASSISTANT

## 2019-08-05 NOTE — PROGRESS NOTES
lesions. · Reflexes: Bilaterally triceps, biceps and brachioradialis are 1+. Clonus absent bilaterally at the feet. No pathological reflexes are noted. · Gait & station:  normal, patient ambulates without assistance and no ataxia  · Additional Examinations:  · RIGHT UPPER EXTREMITY:  Inspection/examination of the right upper extremity does not show any tenderness, deformity or injury. Range of motion is normal and pain-free. There is no gross instability. There are no rashes, ulcerations or lesions. Strength and tone are normal. No atrophy or abnormal movements are noted. · LEFT UPPER EXTREMITY: Inspection/examination of the left upper extremity does not show any tenderness, deformity or injury. Range of motion is normal and pain-free. There is no gross instability. There are no rashes, ulcerations or lesions. Strength and tone are normal. No atrophy or abnormal movements are noted. LUMBAR/SACRAL EXAMINATION:  · Inspection: Local inspection shows no step-off or bruising. Lumbar alignment is normal. No instability is noted. · Palpation:   No evidence of tenderness at the midline. Lumbar paraspinal tenderness Mild L4/5 and L5/S1 tenderness  Bursal tenderness No tenderness bilaterally  There is no paraspinal spasm. · Range of Motion: limited by 25% in all planes due to pain  · Strength:   Strength testing is 5/5 in all muscle groups tested. · Special Tests:   Straight leg raise and crossed SLR negative. Desmond's testing is negative bilaterally. FADIR's testing is negative bilaterally. · Skin: There are no rashes, ulcerations or lesions. · Reflexes: Reflexes are symmetrically 2+ at the patellar and ankle tendons. Clonus absent bilaterally at the feet. · Gait & station: normal, patient ambulates without assistance and no ataxia  · Additional Examinations:  · RIGHT LOWER EXTREMITY: Inspection/examination of the right lower extremity does not show any tenderness, deformity or injury.  Range of motion is unremarkable. There is no gross instability. There are no rashes, ulcerations or lesions. Strength and tone are normal. No atrophy or abnormal movements are noted. · LEFT LOWER EXTREMITY:  Inspection/examination of the left lower extremity does not show any tenderness, deformity or injury. Range of motion is unremarkable. There is no gross instability. There are no rashes, ulcerations or lesions. Strength and tone are normal. No atrophy or abnormal movements are noted. Diagnostic Testing:    Xrays:   None  MRI or CT:  MRI CERVICAL SPINE 03/12/2019   CONCLUSION:  A broad spondylotic C5-6 disc displacement with a superimposed small central disc protrusion encroaches  upon the ventral cord and the exiting C6 nerve root sheaths. Mild to moderate bilateral foraminal stenosis  secondary to Luschka joint hypertrophy and facet hypertrophy with C6 root effacement. Moderate left C3-4 foraminal stenosis secondary to facet and Luschka joint hypertrophy with C4 root  effacement. Straightening and reversal of the cervical lordosis, retrolisthetic microinstability at C5-6 and multilevel facet  and Luschka joint hypertrophy may all contribute to the patient's history of cervical pain. EMG:  None  Results for orders placed or performed in visit on 07/10/19   TSH with Reflex   Result Value Ref Range    TSH 1.80 0.27 - 4.20 uIU/mL   CBC   Result Value Ref Range    WBC 7.8 4.0 - 11.0 K/uL    RBC 5.01 4.20 - 5.90 M/uL    Hemoglobin 15.3 13.5 - 17.5 g/dL    Hematocrit 46.0 40.5 - 52.5 %    MCV 91.9 80.0 - 100.0 fL    MCH 30.5 26.0 - 34.0 pg    MCHC 33.1 31.0 - 36.0 g/dL    RDW 14.3 12.4 - 15.4 %    Platelets 916 927 - 428 K/uL    MPV 8.4 5.0 - 10.5 fL       Impression (Medical Decision Making):       1. HNP (herniated nucleus pulposus) with myelopathy, cervical    2. Cervical radiculopathy    3. Cervical spondylosis without myelopathy    4.  Chronic pain syndrome        Plan (Medical Decision Making):    I discussed the instructed to contact the office between now & his scheduled appointment if he has concerns related to his condition or if he needs assistance in scheduling the above tests. He is welcome to call for an appointment sooner if he has any additional concerns or questions. Mina Hale CRMA am scribing for and in the presence of Joe Qureshi. The physical examination was performed between the patient and JHONNY Qureshi. All counseling during the appointment was performed between the patient and provider. Obdulio Hernandez PA-C, personally performed the services described in this documentation as scribed by Sarwat Quach CRMA in my presence and it is both accurate and complete. OBINNA Camp PA-C  Board Certified by the [employer] on Certification of Physician Assistants  [address] [facility]  Partner of [facility] ([facility])       This dictation was performed with a verbal recognition program [facility]) and it was checked for errors. It is possible that there are still dictated errors within this office note. If so, please bring any errors to my attention for an addendum. All efforts were made to ensure that this office note is accurate.

## 2019-08-07 ENCOUNTER — OFFICE VISIT (OUTPATIENT)
Dept: FAMILY MEDICINE CLINIC | Age: 44
End: 2019-08-07
Payer: COMMERCIAL

## 2019-08-07 VITALS
OXYGEN SATURATION: 98 % | SYSTOLIC BLOOD PRESSURE: 122 MMHG | BODY MASS INDEX: 31.42 KG/M2 | HEART RATE: 76 BPM | WEIGHT: 200.2 LBS | DIASTOLIC BLOOD PRESSURE: 78 MMHG | HEIGHT: 67 IN

## 2019-08-07 DIAGNOSIS — Z99.89 OSA ON CPAP: ICD-10-CM

## 2019-08-07 DIAGNOSIS — K21.9 GASTROESOPHAGEAL REFLUX DISEASE, ESOPHAGITIS PRESENCE NOT SPECIFIED: ICD-10-CM

## 2019-08-07 DIAGNOSIS — Z13.31 POSITIVE DEPRESSION SCREENING: ICD-10-CM

## 2019-08-07 DIAGNOSIS — M50.30 OTHER CERVICAL DISC DEGENERATION, UNSPECIFIED CERVICAL REGION: Primary | ICD-10-CM

## 2019-08-07 DIAGNOSIS — R41.840 DIFFICULTY CONCENTRATING: ICD-10-CM

## 2019-08-07 DIAGNOSIS — G47.9 DIFFICULTY SLEEPING: ICD-10-CM

## 2019-08-07 DIAGNOSIS — G47.33 OSA ON CPAP: ICD-10-CM

## 2019-08-07 PROCEDURE — 99214 OFFICE O/P EST MOD 30 MIN: CPT | Performed by: NURSE PRACTITIONER

## 2019-08-07 PROCEDURE — G8431 POS CLIN DEPRES SCRN F/U DOC: HCPCS | Performed by: NURSE PRACTITIONER

## 2019-08-07 PROCEDURE — G0444 DEPRESSION SCREEN ANNUAL: HCPCS | Performed by: NURSE PRACTITIONER

## 2019-08-07 RX ORDER — PANTOPRAZOLE SODIUM 40 MG/1
40 TABLET, DELAYED RELEASE ORAL DAILY
Qty: 30 TABLET | Refills: 3 | Status: SHIPPED | OUTPATIENT
Start: 2019-08-07 | End: 2020-02-17

## 2019-08-07 ASSESSMENT — PATIENT HEALTH QUESTIONNAIRE - PHQ9
3. TROUBLE FALLING OR STAYING ASLEEP: 3
7. TROUBLE CONCENTRATING ON THINGS, SUCH AS READING THE NEWSPAPER OR WATCHING TELEVISION: 3
6. FEELING BAD ABOUT YOURSELF - OR THAT YOU ARE A FAILURE OR HAVE LET YOURSELF OR YOUR FAMILY DOWN: 1
5. POOR APPETITE OR OVEREATING: 3
SUM OF ALL RESPONSES TO PHQ9 QUESTIONS 1 & 2: 4
4. FEELING TIRED OR HAVING LITTLE ENERGY: 3
2. FEELING DOWN, DEPRESSED OR HOPELESS: 1
SUM OF ALL RESPONSES TO PHQ QUESTIONS 1-9: 20
9. THOUGHTS THAT YOU WOULD BE BETTER OFF DEAD, OR OF HURTING YOURSELF: 0
SUM OF ALL RESPONSES TO PHQ QUESTIONS 1-9: 20
1. LITTLE INTEREST OR PLEASURE IN DOING THINGS: 3
8. MOVING OR SPEAKING SO SLOWLY THAT OTHER PEOPLE COULD HAVE NOTICED. OR THE OPPOSITE, BEING SO FIGETY OR RESTLESS THAT YOU HAVE BEEN MOVING AROUND A LOT MORE THAN USUAL: 3
10. IF YOU CHECKED OFF ANY PROBLEMS, HOW DIFFICULT HAVE THESE PROBLEMS MADE IT FOR YOU TO DO YOUR WORK, TAKE CARE OF THINGS AT HOME, OR GET ALONG WITH OTHER PEOPLE: 3

## 2019-08-07 ASSESSMENT — ENCOUNTER SYMPTOMS
SHORTNESS OF BREATH: 0
BACK PAIN: 1

## 2019-08-07 NOTE — PROGRESS NOTES
Authorizing Provider   pantoprazole (PROTONIX) 40 MG tablet Take 1 tablet by mouth daily Yes Nicanor Blum APRN - CNP   NONFORMULARY  Yes Historical Provider, MD   UNKNOWN TO PATIENT Indications: CBD OIL  Yes Historical Provider, MD   eszopiclone (LUNESTA) 1 MG TABS Take 1 tablet by mouth nightly for 30 days. Yes Andrewsfelisaai Houston ANA CRISTINA Crum - CNP   colchicine (COLCRYS) 0.6 MG tablet TAKE 2 TABLETS BY MOUTH ONCE THEN 1 TABLET 1 HOUR LATER. THEN TAKE 1 TABLET TWICE DAILY AS NEEDED FOR GOUT Yes Duane Albert MD        Social History     Tobacco Use    Smoking status: Never Smoker    Smokeless tobacco: Never Used   Substance Use Topics    Alcohol use: Yes     Alcohol/week: 1.0 standard drinks     Types: 1 Cans of beer per week     Comment: occasionally         Vitals:    08/07/19 1003   BP: 122/78   Site: Right Upper Arm   Position: Sitting   Cuff Size: Large Adult   Pulse: 76   SpO2: 98%   Weight: 200 lb 3.2 oz (90.8 kg)   Height: 5' 7\" (1.702 m)     Estimated body mass index is 31.36 kg/m² as calculated from the following:    Height as of this encounter: 5' 7\" (1.702 m). Weight as of this encounter: 200 lb 3.2 oz (90.8 kg). Physical Exam   Constitutional: He is oriented to person, place, and time. He appears well-developed and well-nourished. HENT:   Head: Normocephalic. Right Ear: Tympanic membrane, external ear and ear canal normal.   Left Ear: Tympanic membrane, external ear and ear canal normal.   Nose: Nose normal.   Mouth/Throat: Uvula is midline, oropharynx is clear and moist and mucous membranes are normal.   Cardiovascular: Normal rate, regular rhythm, normal heart sounds and normal pulses. Pulmonary/Chest: Effort normal and breath sounds normal.   Musculoskeletal: He exhibits no edema. Cervical back: He exhibits tenderness. Neurological: He is alert and oriented to person, place, and time. Psychiatric: He has a normal mood and affect. ASSESSMENT/PLAN:  1.  Other cervical disc degeneration, unspecified cervical region  Stable;  Continue to f/u with ortho. 2. Gastroesophageal reflux disease, esophagitis presence not specified  Stable;  Refilled protonix,  - pantoprazole (PROTONIX) 40 MG tablet; Take 1 tablet by mouth daily  Dispense: 30 tablet; Refill: 3    3. Difficulty concentrating  Stable;  Referral for evaluation. Gave patient [employer] [facility] [employer] [facility] number to schedule. Discussed could consider Wellbutrin, patient does not want to take anymore medication. 4. Positive depression screening  Stable;  See 3  - Positive Screen for Clinical Depression with a Documented Follow-up Plan     5. Difficulty sleeping  Stable;  Patient would like to stay on 2 mg of Lunesta. Continue to wear cpap. 6. DANGELO on CPAP  Stable;  Sleep medicine encouraged patient to continuously wear x 3 months. Patient has a scheduled f/u appointment 9/17. Follow Up:     Return in about 2 months (around 10/7/2019). On the basis of positive PHQ-9 screening (PHQ-9 Total Score: 20), the following plan was implemented: referral to psychiatry provided. Patient will follow-up in 2 month(s) with PCP.

## 2019-08-08 ENCOUNTER — TELEPHONE (OUTPATIENT)
Dept: FAMILY MEDICINE CLINIC | Age: 44
End: 2019-08-08

## 2019-08-09 ENCOUNTER — TELEPHONE (OUTPATIENT)
Dept: ORTHOPEDIC SURGERY | Age: 44
End: 2019-08-09

## 2019-08-09 RX ORDER — BUPROPION HYDROCHLORIDE 150 MG/1
150 TABLET ORAL EVERY MORNING
Qty: 30 TABLET | Refills: 0 | Status: SHIPPED | OUTPATIENT
Start: 2019-08-09 | End: 2019-09-03 | Stop reason: SDUPTHER

## 2019-08-09 NOTE — TELEPHONE ENCOUNTER
Pt also called to say he needs to have his time extended to be off work until the end of this month due to not having his procedure yet. If possible, please let pt know. Pt also states the referral needs to be completed today in order for him to schedule.

## 2019-08-14 ENCOUNTER — TELEPHONE (OUTPATIENT)
Dept: ORTHOPEDIC SURGERY | Age: 44
End: 2019-08-14

## 2019-08-14 NOTE — TELEPHONE ENCOUNTER
Auth: NPR  Date: 8/21/19  CPT: 06210  DX: M50.00, M54.12, M47.812, G89.4   Outpatient  Procedure: NAVEED  SX Location: [facility]   Insurance: [facility]  Physician: GABBIE        CPT: 38439 [id number], 04420     [address] [facility]

## 2019-08-15 ENCOUNTER — OFFICE VISIT (OUTPATIENT)
Dept: FAMILY MEDICINE CLINIC | Age: 44
End: 2019-08-15
Payer: COMMERCIAL

## 2019-08-15 VITALS
HEIGHT: 67 IN | OXYGEN SATURATION: 97 % | BODY MASS INDEX: 31.08 KG/M2 | WEIGHT: 198 LBS | SYSTOLIC BLOOD PRESSURE: 118 MMHG | DIASTOLIC BLOOD PRESSURE: 78 MMHG | HEART RATE: 105 BPM

## 2019-08-15 DIAGNOSIS — M54.2 NECK PAIN: Primary | ICD-10-CM

## 2019-08-15 DIAGNOSIS — R61 EXCESSIVE SWEATING: ICD-10-CM

## 2019-08-15 DIAGNOSIS — R41.840 DIFFICULTY CONCENTRATING: ICD-10-CM

## 2019-08-15 DIAGNOSIS — G47.9 DIFFICULTY SLEEPING: ICD-10-CM

## 2019-08-15 DIAGNOSIS — F41.9 ANXIETY: ICD-10-CM

## 2019-08-15 PROCEDURE — 99214 OFFICE O/P EST MOD 30 MIN: CPT | Performed by: NURSE PRACTITIONER

## 2019-08-15 ASSESSMENT — ENCOUNTER SYMPTOMS
APNEA: 1
SHORTNESS OF BREATH: 0

## 2019-08-20 ENCOUNTER — TELEPHONE (OUTPATIENT)
Dept: ORTHOPEDIC SURGERY | Age: 44
End: 2019-08-20

## 2019-08-20 NOTE — TELEPHONE ENCOUNTER
Patient calls and states he  was seen in the ER at [address], 8/19/2019. He was given tylenol and prednisone 4 mg tablet at the hospital.   He was given a muscle relaxer and hydrocodone RX to go take home. He is scheduled for C6/C7 [place] NAVEED  On 8/21/2019. He calls to verify it is ok for him to proceed with NAVEED. He states he is able to lie down.  He will keep his appt for csp inj

## 2019-08-21 ENCOUNTER — HOSPITAL ENCOUNTER (OUTPATIENT)
Age: 44
Setting detail: OUTPATIENT SURGERY
Discharge: HOME | End: 2019-08-21
Attending: PHYSICAL MEDICINE & REHABILITATION | Admitting: PHYSICAL MEDICINE & REHABILITATION
Payer: COMMERCIAL

## 2019-08-21 ENCOUNTER — APPOINTMENT (OUTPATIENT)
Dept: GENERAL RADIOLOGY | Age: 44
End: 2019-08-21
Attending: PHYSICAL MEDICINE & REHABILITATION
Payer: COMMERCIAL

## 2019-08-21 ENCOUNTER — TELEPHONE (OUTPATIENT)
Dept: ORTHOPEDIC SURGERY | Age: 44
End: 2019-08-21

## 2019-08-21 VITALS
TEMPERATURE: 97.5 F | BODY MASS INDEX: 30.61 KG/M2 | OXYGEN SATURATION: 99 % | HEIGHT: 67 IN | RESPIRATION RATE: 16 BRPM | HEART RATE: 79 BPM | SYSTOLIC BLOOD PRESSURE: 121 MMHG | WEIGHT: 195 LBS | DIASTOLIC BLOOD PRESSURE: 81 MMHG

## 2019-08-21 DIAGNOSIS — M47.816 SPONDYLOSIS WITHOUT MYELOPATHY OR RADICULOPATHY, LUMBAR REGION: Primary | ICD-10-CM

## 2019-08-21 DIAGNOSIS — G89.4 CHRONIC PAIN SYNDROME: ICD-10-CM

## 2019-08-21 PROCEDURE — 2709999900 HC NON-CHARGEABLE SUPPLY: Performed by: PHYSICAL MEDICINE & REHABILITATION

## 2019-08-21 PROCEDURE — 6360000002 HC RX W HCPCS: Performed by: PHYSICAL MEDICINE & REHABILITATION

## 2019-08-21 PROCEDURE — 2580000003 HC RX 258: Performed by: PHYSICAL MEDICINE & REHABILITATION

## 2019-08-21 PROCEDURE — 99152 MOD SED SAME PHYS/QHP 5/>YRS: CPT | Performed by: PHYSICAL MEDICINE & REHABILITATION

## 2019-08-21 PROCEDURE — 3610000054 HC PAIN LEVEL 3 BASE (NON-OR): Performed by: PHYSICAL MEDICINE & REHABILITATION

## 2019-08-21 PROCEDURE — 2500000003 HC RX 250 WO HCPCS: Performed by: PHYSICAL MEDICINE & REHABILITATION

## 2019-08-21 PROCEDURE — 77003 FLUOROGUIDE FOR SPINE INJECT: CPT

## 2019-08-21 RX ORDER — LIDOCAINE HYDROCHLORIDE 10 MG/ML
INJECTION, SOLUTION INFILTRATION; PERINEURAL
Status: COMPLETED | OUTPATIENT
Start: 2019-08-21 | End: 2019-08-21

## 2019-08-21 RX ORDER — HYDROCODONE BITARTRATE AND ACETAMINOPHEN 5; 325 MG/1; MG/1
1 TABLET ORAL
COMMUNITY
Start: 2019-08-20 | End: 2019-08-25

## 2019-08-21 RX ORDER — 0.9 % SODIUM CHLORIDE 0.9 %
VIAL (ML) INJECTION
Status: COMPLETED | OUTPATIENT
Start: 2019-08-21 | End: 2019-08-21

## 2019-08-21 RX ORDER — CYCLOBENZAPRINE HCL 10 MG
10 TABLET ORAL 2 TIMES DAILY PRN
COMMUNITY
Start: 2019-08-20 | End: 2019-09-06 | Stop reason: ALTCHOICE

## 2019-08-21 RX ORDER — BETAMETHASONE SODIUM PHOSPHATE AND BETAMETHASONE ACETATE 3; 3 MG/ML; MG/ML
INJECTION, SUSPENSION INTRA-ARTICULAR; INTRALESIONAL; INTRAMUSCULAR; SOFT TISSUE
Status: COMPLETED | OUTPATIENT
Start: 2019-08-21 | End: 2019-08-21

## 2019-08-21 RX ORDER — MIDAZOLAM HYDROCHLORIDE 1 MG/ML
INJECTION INTRAMUSCULAR; INTRAVENOUS
Status: COMPLETED | OUTPATIENT
Start: 2019-08-21 | End: 2019-08-21

## 2019-08-21 ASSESSMENT — PAIN - FUNCTIONAL ASSESSMENT: PAIN_FUNCTIONAL_ASSESSMENT: 0-10

## 2019-08-21 ASSESSMENT — PAIN SCALES - GENERAL: PAINLEVEL_OUTOF10: 3

## 2019-08-21 ASSESSMENT — PAIN DESCRIPTION - DESCRIPTORS: DESCRIPTORS: ACHING;THROBBING;TIGHTNESS

## 2019-08-21 NOTE — LETTER
Please schedule the following with:     Date:       Account: [de-identified]  Patient: Param Narayan    : 1975  Address:  [address] [place] [address] [place] [address]    Phone (H):  [phone] (home)      ----------------------------------------------------------------------------------------------  Diagnosis:     ICD-10-CM    1. Spondylosis without myelopathy or radiculopathy, lumbar region M47.816    2. Chronic pain syndrome G89.4      Procedure: Lumbar Medial Branch Block #1   Levels: L3, L4, L5 DR  Side: Bilateral   CPT Codes     ----------------------------------------------------------------------------------------------  Injection #      Attending Physician       Stacey Chavez.  Phillip Whitt MD.      ----------------------------------------------------------------------------------------------  Injection Scheduled For:    At:    1st Insurance     Pre-Cert#    2nd Insurance    Pre-Cert#    Comments:    · Infection control  · Tested positive for MRSA in past 12 months:  no  · Tested positive for MSSA \"staph infection\" in past 12 months: no  · Tested positive for VRE (Vancomycin Resistant Enterococci) in past 12 months:   no  · Currently on any antibiotics for an infection: no  · Anticoagulants:  · On a blood thinner:  no   · Any history of bleeding disorder: no   · Advanced Liver disease: no   · Advanced Renal disease: no   · Glaucoma: no   · Diabetes: no     Sedation:  No  -----------------------------------------------------------------------------------------------  No Known Allergies

## 2019-08-23 ENCOUNTER — TELEPHONE (OUTPATIENT)
Dept: FAMILY MEDICINE CLINIC | Age: 44
End: 2019-08-23

## 2019-09-03 RX ORDER — BUPROPION HYDROCHLORIDE 150 MG/1
150 TABLET ORAL EVERY MORNING
Qty: 30 TABLET | Refills: 0 | Status: SHIPPED | OUTPATIENT
Start: 2019-09-03 | End: 2019-09-06 | Stop reason: SINTOL

## 2019-09-03 NOTE — TELEPHONE ENCOUNTER
Last Fill 8/9/19  Last Office Visit 8/15/19  Return in about 1 week (around 8/22/2019).   No Pending Appointments

## 2019-09-06 ENCOUNTER — OFFICE VISIT (OUTPATIENT)
Dept: ORTHOPEDIC SURGERY | Age: 44
End: 2019-09-06
Payer: COMMERCIAL

## 2019-09-06 ENCOUNTER — OFFICE VISIT (OUTPATIENT)
Dept: FAMILY MEDICINE CLINIC | Age: 44
End: 2019-09-06
Payer: COMMERCIAL

## 2019-09-06 VITALS
DIASTOLIC BLOOD PRESSURE: 88 MMHG | HEART RATE: 112 BPM | WEIGHT: 190 LBS | HEIGHT: 67 IN | RESPIRATION RATE: 16 BRPM | SYSTOLIC BLOOD PRESSURE: 122 MMHG | BODY MASS INDEX: 29.82 KG/M2

## 2019-09-06 VITALS
SYSTOLIC BLOOD PRESSURE: 118 MMHG | BODY MASS INDEX: 30.79 KG/M2 | HEART RATE: 132 BPM | OXYGEN SATURATION: 96 % | WEIGHT: 196.2 LBS | HEIGHT: 67 IN | DIASTOLIC BLOOD PRESSURE: 84 MMHG

## 2019-09-06 DIAGNOSIS — M50.30 OTHER CERVICAL DISC DEGENERATION, UNSPECIFIED CERVICAL REGION: ICD-10-CM

## 2019-09-06 DIAGNOSIS — M54.16 LUMBAR RADICULOPATHY: ICD-10-CM

## 2019-09-06 DIAGNOSIS — G89.4 CHRONIC PAIN SYNDROME: Primary | ICD-10-CM

## 2019-09-06 DIAGNOSIS — R41.840 DIFFICULTY CONCENTRATING: ICD-10-CM

## 2019-09-06 DIAGNOSIS — M51.26 HNP (HERNIATED NUCLEUS PULPOSUS), LUMBAR: ICD-10-CM

## 2019-09-06 DIAGNOSIS — M54.41 CHRONIC LOW BACK PAIN WITH RIGHT-SIDED SCIATICA, UNSPECIFIED BACK PAIN LATERALITY: ICD-10-CM

## 2019-09-06 DIAGNOSIS — M54.12 CERVICAL RADICULOPATHY: ICD-10-CM

## 2019-09-06 DIAGNOSIS — M50.00 HNP (HERNIATED NUCLEUS PULPOSUS) WITH MYELOPATHY, CERVICAL: ICD-10-CM

## 2019-09-06 DIAGNOSIS — R61 EXCESSIVE SWEATING: ICD-10-CM

## 2019-09-06 DIAGNOSIS — G89.29 CHRONIC LOW BACK PAIN WITH RIGHT-SIDED SCIATICA, UNSPECIFIED BACK PAIN LATERALITY: ICD-10-CM

## 2019-09-06 DIAGNOSIS — F41.9 ANXIETY: Primary | ICD-10-CM

## 2019-09-06 PROCEDURE — 99214 OFFICE O/P EST MOD 30 MIN: CPT | Performed by: PHYSICIAN ASSISTANT

## 2019-09-06 PROCEDURE — 99214 OFFICE O/P EST MOD 30 MIN: CPT | Performed by: NURSE PRACTITIONER

## 2019-09-06 RX ORDER — METHYLPREDNISOLONE 4 MG/1
TABLET ORAL
Qty: 1 KIT | Refills: 0 | Status: SHIPPED | OUTPATIENT
Start: 2019-09-06 | End: 2019-09-06 | Stop reason: ALTCHOICE

## 2019-09-06 RX ORDER — FLUOXETINE HYDROCHLORIDE 20 MG/1
20 CAPSULE ORAL DAILY
Qty: 30 CAPSULE | Refills: 0 | Status: SHIPPED | OUTPATIENT
Start: 2019-09-06 | End: 2019-10-02 | Stop reason: SDUPTHER

## 2019-09-06 RX ORDER — TIZANIDINE 2 MG/1
2-4 TABLET ORAL 3 TIMES DAILY PRN
Qty: 30 TABLET | Refills: 0 | Status: SHIPPED | OUTPATIENT
Start: 2019-09-06 | End: 2019-11-26 | Stop reason: SDUPTHER

## 2019-09-06 ASSESSMENT — ENCOUNTER SYMPTOMS: BACK PAIN: 1

## 2019-09-06 NOTE — PROGRESS NOTES
reflexes are noted. · Gait & station: normal, patient ambulates without assistance and no ataxia  · Additional Examinations:  · RIGHT UPPER EXTREMITY:  Inspection/examination of the right upper extremity does not show any tenderness, deformity or injury. Range of motion is unremarkable and pain-free. There is no gross instability. There are no rashes, ulcerations or lesions. Strength and tone are normal. No atrophy or abnormal movements are noted. · LEFT UPPER EXTREMITY: Inspection/examination of the left upper extremity does not show any tenderness, deformity or injury. Range of motion is unremarkable and pain-free. There is no gross instability. There are no rashes, ulcerations or lesions. Strength and tone are normal. No atrophy or abnormal movements are noted. LUMBAR/SACRAL EXAMINATION:  · Inspection: Local inspection shows no step-off or bruising. Lumbar alignment is normal. No instability is noted. · Palpation:   No evidence of tenderness at the midline. Lumbar paraspinal tenderness: Very limited  Bursal tenderness No tenderness bilaterally  There is no paraspinal spasm. · Range of Motion: very limited due to pain  · Strength:   Strength testing is 5/5 in all muscle groups tested. · Special Tests:   Straight leg raise positive bilaterally and crossed SLR negative. Desmond's testing is negative bilaterally. FADIR's testing is negative bilaterally. · Skin: There are no rashes, ulcerations or lesions. · Reflexes: Reflexes are symmetrically 2+ at the patellar and ankle tendons. Clonus absent bilaterally at the feet. · Gait & station: normal, patient ambulates without assistance and no ataxia  · Additional Examinations:  · RIGHT LOWER EXTREMITY: Inspection/examination of the right lower extremity does not show any tenderness, deformity or injury. Range of motion is normal and pain-free. There is no gross instability. There are no rashes, ulcerations or lesions.  Strength and tone are normal. No atrophy or abnormal movements are noted. · LEFT LOWER EXTREMITY:  Inspection/examination of the left lower extremity does not show any tenderness, deformity or injury. Range of motion is normal and pain-free. There is no gross instability. There are no rashes, ulcerations or lesions. Strength and tone are normal. No atrophy or abnormal movements are noted. Diagnostic Testing:    MR cervical spine shows 03/12/2019   CONCLUSION:  A broad spondylotic C5-6 disc displacement with a superimposed small central disc protrusion encroaches  upon the ventral cord and the exiting C6 nerve root sheaths. Mild to moderate bilateral foraminal stenosis  secondary to Luschka joint hypertrophy and facet hypertrophy with C6 root effacement. Moderate left C3-4 foraminal stenosis secondary to facet and Luschka joint hypertrophy with C4 root  effacement. Straightening and reversal of the cervical lordosis, retrolisthetic microinstability at C5-6 and multilevel facet  and Luschka joint hypertrophy may all contribute to the patient's history of cervical pain. Results for orders placed or performed in visit on 07/10/19   TSH with Reflex   Result Value Ref Range    TSH 1.80 0.27 - 4.20 uIU/mL   CBC   Result Value Ref Range    WBC 7.8 4.0 - 11.0 K/uL    RBC 5.01 4.20 - 5.90 M/uL    Hemoglobin 15.3 13.5 - 17.5 g/dL    Hematocrit 46.0 40.5 - 52.5 %    MCV 91.9 80.0 - 100.0 fL    MCH 30.5 26.0 - 34.0 pg    MCHC 33.1 31.0 - 36.0 g/dL    RDW 14.3 12.4 - 15.4 %    Platelets 524 983 - 630 K/uL    MPV 8.4 5.0 - 10.5 fL     Impression:       1. Chronic pain syndrome    2. HNP (herniated nucleus pulposus) with myelopathy, cervical    3. Cervical radiculopathy    4. HNP (herniated nucleus pulposus), lumbar    5. Lumbar radiculopathy        Plan:  Clinical Course: Above diagnoses are worsening diagnoses 4-5; diagnoses 1-3 show no change. I discussed the diagnosis and the treatment options with Cate Milian today. In Summary:   The

## 2019-09-08 ASSESSMENT — ENCOUNTER SYMPTOMS
CONSTIPATION: 0
DIARRHEA: 0

## 2019-10-02 DIAGNOSIS — F41.9 ANXIETY: ICD-10-CM

## 2019-10-03 RX ORDER — FLUOXETINE HYDROCHLORIDE 20 MG/1
20 CAPSULE ORAL DAILY
Qty: 30 CAPSULE | Refills: 2 | Status: SHIPPED | OUTPATIENT
Start: 2019-10-03 | End: 2020-02-17

## 2019-10-04 ENCOUNTER — TELEPHONE (OUTPATIENT)
Dept: ORTHOPEDIC SURGERY | Age: 44
End: 2019-10-04

## 2019-10-25 RX ORDER — COLCHICINE 0.6 MG/1
TABLET ORAL
Qty: 30 TABLET | Refills: 0 | Status: SHIPPED | OUTPATIENT
Start: 2019-10-25 | End: 2019-11-14 | Stop reason: SDUPTHER

## 2019-11-15 RX ORDER — COLCHICINE 0.6 MG/1
1.2 TABLET ORAL ONCE
Qty: 60 TABLET | Refills: 0 | Status: SHIPPED | OUTPATIENT
Start: 2019-11-15 | End: 2020-02-17

## 2019-11-27 RX ORDER — TIZANIDINE 2 MG/1
TABLET ORAL
Qty: 30 TABLET | Refills: 0 | Status: SHIPPED | OUTPATIENT
Start: 2019-11-27 | End: 2020-01-10

## 2019-12-04 ENCOUNTER — OFFICE VISIT (OUTPATIENT)
Dept: FAMILY MEDICINE CLINIC | Age: 44
End: 2019-12-04
Payer: COMMERCIAL

## 2019-12-04 ENCOUNTER — HOSPITAL ENCOUNTER (OUTPATIENT)
Dept: GENERAL RADIOLOGY | Age: 44
Discharge: HOME | End: 2019-12-04
Payer: COMMERCIAL

## 2019-12-04 ENCOUNTER — HOSPITAL ENCOUNTER (OUTPATIENT)
Age: 44
Discharge: HOME | End: 2019-12-04
Payer: COMMERCIAL

## 2019-12-04 VITALS
DIASTOLIC BLOOD PRESSURE: 88 MMHG | BODY MASS INDEX: 32.89 KG/M2 | HEIGHT: 65 IN | WEIGHT: 197.4 LBS | OXYGEN SATURATION: 99 % | HEART RATE: 76 BPM | SYSTOLIC BLOOD PRESSURE: 126 MMHG

## 2019-12-04 DIAGNOSIS — M25.562 CHRONIC PAIN OF LEFT KNEE: Primary | ICD-10-CM

## 2019-12-04 DIAGNOSIS — M25.562 CHRONIC PAIN OF LEFT KNEE: ICD-10-CM

## 2019-12-04 DIAGNOSIS — R93.89 ABNORMAL X-RAY: ICD-10-CM

## 2019-12-04 DIAGNOSIS — G89.29 CHRONIC PAIN OF LEFT KNEE: Primary | ICD-10-CM

## 2019-12-04 DIAGNOSIS — G89.29 CHRONIC PAIN OF LEFT KNEE: ICD-10-CM

## 2019-12-04 PROCEDURE — 99213 OFFICE O/P EST LOW 20 MIN: CPT | Performed by: FAMILY MEDICINE

## 2019-12-04 PROCEDURE — 73562 X-RAY EXAM OF KNEE 3: CPT

## 2019-12-09 DIAGNOSIS — R93.89 ABNORMAL X-RAY: ICD-10-CM

## 2019-12-09 DIAGNOSIS — M25.562 CHRONIC PAIN OF LEFT KNEE: Primary | ICD-10-CM

## 2019-12-09 DIAGNOSIS — G89.29 CHRONIC PAIN OF LEFT KNEE: Primary | ICD-10-CM

## 2019-12-12 DIAGNOSIS — S83.249A ACUTE MEDIAL MENISCAL TEAR, UNSPECIFIED LATERALITY, INITIAL ENCOUNTER: Primary | ICD-10-CM

## 2019-12-17 ENCOUNTER — OFFICE VISIT (OUTPATIENT)
Dept: ORTHOPEDIC SURGERY | Age: 44
End: 2019-12-17
Payer: COMMERCIAL

## 2019-12-17 VITALS — HEIGHT: 69 IN | WEIGHT: 197 LBS | BODY MASS INDEX: 29.18 KG/M2

## 2019-12-17 DIAGNOSIS — M23.92 PATELLAR MALALIGNMENT SYNDROME OF LEFT KNEE: ICD-10-CM

## 2019-12-17 DIAGNOSIS — M25.562 LEFT KNEE PAIN, UNSPECIFIED CHRONICITY: Primary | ICD-10-CM

## 2019-12-17 PROCEDURE — 99204 OFFICE O/P NEW MOD 45 MIN: CPT | Performed by: ORTHOPAEDIC SURGERY

## 2019-12-20 ENCOUNTER — TELEPHONE (OUTPATIENT)
Dept: ORTHOPEDIC SURGERY | Age: 44
End: 2019-12-20

## 2020-01-06 ENCOUNTER — TELEPHONE (OUTPATIENT)
Dept: FAMILY MEDICINE CLINIC | Age: 45
End: 2020-01-06

## 2020-01-06 NOTE — TELEPHONE ENCOUNTER
Pt wanted this to be external so he he could pick his own surgeon, pt was informed to let us know who his surgeon was going ot be and we would send referral to him if needed

## 2020-01-06 NOTE — TELEPHONE ENCOUNTER
Pt is having a lot of back pain, Orthopedic suggested he see a general surgeon to have Lipoma on back removed, pt is requesting referral for external surgeon   Please advise

## 2020-01-13 RX ORDER — TIZANIDINE 2 MG/1
TABLET ORAL
Qty: 30 TABLET | Refills: 0 | Status: SHIPPED | OUTPATIENT
Start: 2020-01-13 | End: 2020-02-17 | Stop reason: SDUPTHER

## 2020-02-17 ENCOUNTER — OFFICE VISIT (OUTPATIENT)
Dept: FAMILY MEDICINE CLINIC | Age: 45
End: 2020-02-17
Payer: COMMERCIAL

## 2020-02-17 VITALS
HEIGHT: 69 IN | OXYGEN SATURATION: 96 % | WEIGHT: 201.6 LBS | DIASTOLIC BLOOD PRESSURE: 80 MMHG | HEART RATE: 86 BPM | SYSTOLIC BLOOD PRESSURE: 108 MMHG | BODY MASS INDEX: 29.86 KG/M2

## 2020-02-17 DIAGNOSIS — R73.03 PREDIABETES: ICD-10-CM

## 2020-02-17 DIAGNOSIS — E78.2 MIXED HYPERLIPIDEMIA: ICD-10-CM

## 2020-02-17 DIAGNOSIS — Z00.00 WELL ADULT EXAM: ICD-10-CM

## 2020-02-17 LAB
A/G RATIO: 2.4 (ref 1.1–2.2)
ALBUMIN SERPL-MCNC: 5 G/DL (ref 3.4–5)
ALP BLD-CCNC: 74 U/L (ref 40–129)
ALT SERPL-CCNC: 45 U/L (ref 10–40)
ANION GAP SERPL CALCULATED.3IONS-SCNC: 15 MMOL/L (ref 3–16)
AST SERPL-CCNC: 31 U/L (ref 15–37)
BILIRUB SERPL-MCNC: 0.3 MG/DL (ref 0–1)
BUN BLDV-MCNC: 13 MG/DL (ref 7–20)
CALCIUM SERPL-MCNC: 10.2 MG/DL (ref 8.3–10.6)
CHLORIDE BLD-SCNC: 105 MMOL/L (ref 99–110)
CHOLESTEROL, TOTAL: 268 MG/DL (ref 0–199)
CO2: 25 MMOL/L (ref 21–32)
CREAT SERPL-MCNC: 0.9 MG/DL (ref 0.9–1.3)
GFR AFRICAN AMERICAN: >60
GFR NON-AFRICAN AMERICAN: >60
GLOBULIN: 2.1 G/DL
GLUCOSE BLD-MCNC: 131 MG/DL (ref 70–99)
HDLC SERPL-MCNC: 38 MG/DL (ref 40–60)
LDL CHOLESTEROL CALCULATED: ABNORMAL MG/DL
LDL CHOLESTEROL DIRECT: 121 MG/DL
POTASSIUM SERPL-SCNC: 4.6 MMOL/L (ref 3.5–5.1)
SODIUM BLD-SCNC: 145 MMOL/L (ref 136–145)
TOTAL PROTEIN: 7.1 G/DL (ref 6.4–8.2)
TRIGL SERPL-MCNC: 526 MG/DL (ref 0–150)
VLDLC SERPL CALC-MCNC: ABNORMAL MG/DL

## 2020-02-17 PROCEDURE — 99396 PREV VISIT EST AGE 40-64: CPT | Performed by: FAMILY MEDICINE

## 2020-02-17 RX ORDER — TIZANIDINE 2 MG/1
2 TABLET ORAL EVERY 8 HOURS PRN
Qty: 30 TABLET | Refills: 0 | Status: SHIPPED | OUTPATIENT
Start: 2020-02-17 | End: 2020-11-06 | Stop reason: SDUPTHER

## 2020-02-17 ASSESSMENT — PATIENT HEALTH QUESTIONNAIRE - PHQ9
1. LITTLE INTEREST OR PLEASURE IN DOING THINGS: 0
SUM OF ALL RESPONSES TO PHQ QUESTIONS 1-9: 0
SUM OF ALL RESPONSES TO PHQ9 QUESTIONS 1 & 2: 0
SUM OF ALL RESPONSES TO PHQ QUESTIONS 1-9: 0
2. FEELING DOWN, DEPRESSED OR HOPELESS: 0

## 2020-02-17 ASSESSMENT — ENCOUNTER SYMPTOMS: RESPIRATORY NEGATIVE: 1

## 2020-02-17 NOTE — PROGRESS NOTES
Subjective:      Patient ID: Annie Montero is a 38 y.o. male. HPI   Pt is a of 38 y.o. male comes in today with   Chief Complaint   Patient presents with    Annual Exam     Patient is here for his yearly physical, is fasting. Knee stable. Has small meniscal tear. Recovered from lipoma removed. Diet has been good but gaining wt since hasn't been able to exercise. Past Medical History:Reviewed  Medications:Reviewed. No Known Allergies   Social hx:Reviewed. Social History     Tobacco Use   Smoking Status Never Smoker   Smokeless Tobacco Never Used       Vitals:    02/17/20 0828   BP: 108/80   Site: Right Upper Arm   Position: Sitting   Cuff Size: Large Adult   Pulse: 86   SpO2: 96%   Weight: 201 lb 9.6 oz (91.4 kg)   Height: 5' 9\" (1.753 m)     Review of Systems   Constitutional: Negative. Respiratory: Negative. Objective:   Physical Exam  Constitutional:       General: He is not in acute distress. Appearance: Normal appearance. He is well-developed. HENT:      Head: Normocephalic and atraumatic. Right Ear: Tympanic membrane, ear canal and external ear normal.      Left Ear: Tympanic membrane, ear canal and external ear normal.   Eyes:      General: No scleral icterus. Conjunctiva/sclera: Conjunctivae normal.   Neck:      Musculoskeletal: Normal range of motion and neck supple. Thyroid: No thyromegaly. Cardiovascular:      Rate and Rhythm: Normal rate and regular rhythm. Heart sounds: Normal heart sounds. No murmur. Pulmonary:      Effort: Pulmonary effort is normal.      Breath sounds: Normal breath sounds. No wheezing or rales. Abdominal:      General: Bowel sounds are normal. There is no distension. Palpations: Abdomen is soft. There is no hepatomegaly or splenomegaly. Tenderness: There is no abdominal tenderness. Skin:     General: Skin is warm and dry. Neurological:      Mental Status: He is alert and oriented to person, place, and time.

## 2020-02-18 LAB
ESTIMATED AVERAGE GLUCOSE: 125.5 MG/DL
HBA1C MFR BLD: 6 %

## 2020-04-29 RX ORDER — COLCHICINE 0.6 MG/1
TABLET ORAL
Qty: 60 TABLET | Refills: 0 | Status: SHIPPED | OUTPATIENT
Start: 2020-04-29 | End: 2020-06-03

## 2020-06-03 RX ORDER — COLCHICINE 0.6 MG/1
TABLET ORAL
Qty: 60 TABLET | Refills: 0 | Status: SHIPPED | OUTPATIENT
Start: 2020-06-03 | End: 2020-08-10

## 2020-08-10 RX ORDER — COLCHICINE 0.6 MG/1
0.6 TABLET ORAL 2 TIMES DAILY PRN
Qty: 60 TABLET | Refills: 0 | Status: SHIPPED | OUTPATIENT
Start: 2020-08-10 | End: 2020-09-28

## 2020-09-28 RX ORDER — COLCHICINE 0.6 MG/1
TABLET ORAL
Qty: 60 TABLET | Refills: 0 | Status: SHIPPED | OUTPATIENT
Start: 2020-09-28 | End: 2020-11-24

## 2020-10-14 ENCOUNTER — OFFICE VISIT (OUTPATIENT)
Dept: ORTHOPEDIC SURGERY | Age: 45
End: 2020-10-14
Payer: COMMERCIAL

## 2020-10-14 ENCOUNTER — OFFICE VISIT (OUTPATIENT)
Dept: PRIMARY CARE CLINIC | Age: 45
End: 2020-10-14
Payer: COMMERCIAL

## 2020-10-14 VITALS — BODY MASS INDEX: 29.77 KG/M2 | WEIGHT: 201 LBS | HEIGHT: 69 IN | RESPIRATION RATE: 12 BRPM | TEMPERATURE: 96.9 F

## 2020-10-14 PROCEDURE — 99211 OFF/OP EST MAY X REQ PHY/QHP: CPT | Performed by: NURSE PRACTITIONER

## 2020-10-14 PROCEDURE — 99214 OFFICE O/P EST MOD 30 MIN: CPT | Performed by: PHYSICIAN ASSISTANT

## 2020-10-14 NOTE — PROGRESS NOTES
Follow up: Simone Graham  1975  [id number]         Chief Complaint   Patient presents with    Neck Pain     F/u CSP; LOV 9/6/19         HISTORY OF PRESENT ILLNESS:  Mr. Gisela Fung is a 54 y.o. male returns for a follow up visit for multiple medical problems. His current presenting problems are   1. Cervical radiculopathy    2. HNP (herniated nucleus pulposus), cervical    3. Acute pain of right shoulder    4. Dysfunction of right rotator cuff    . As per information/history obtained from the PADT(patient assessment and documentation tool) - He complains of pain in the neck with radiation to the shoulders Right and arms Right He rates the pain 8/10 and describes it as dull, aching, burning. Pain is made worse by: movement. He denies side effects from the current pain regimen. Patient reports that since the last follow up visit the physical functioning is worse, family/social relationships are worse, mood is worse and sleep patterns are worse, and that the overall functioning is worse. Patient denies neurological bowel or bladder. The patient was last seen in the office on 9/6/2019 and presents today in follow-up due to worsening neck and right shoulder and arm pain. The patient had a C6-7 interlaminar epidural steroid injection completed on 8/21/2019. The patient at the time had 20% improvement of his neck pain. The patient was seeing a surgeon at [facility] on 9/13/2019. The patient was seeing the surgeon more for his lumbar spine pain and he did not end up having surgery. The patient describes that after the injection he notes seeing the chiropractor and he describes that the injection he believes actually helped at about 60% relief as he was able to get adjusted and this helped with his pain in the neck and right arm for a number of months. The patient describes that over the last 4 to 5 months however his pain has significantly worsened.   He has not taken any medication to help with his pain. The patient also notes that he is having some increased right shoulder pain which seems to be different than the usual radiating neck pain. Patient was playing tennis with his daughter and noted on a service that he felt pain into the shoulder itself. His radiating neck pain seems to come through the shoulder and down to approximately the elbow but does not extend into the hand. Associated signs and symptoms:   Neurogenic bowel or bladder symptoms:  no   Perceived weakness:  yes   Difficulty walking:  no            Past medical, surgical, social and family history reviewed with the patient. Past Medical History:   Past Medical History:   Diagnosis Date    Chronic back pain     Frequent urination     GERD (gastroesophageal reflux disease)     Sleep apnea     with c pap      Past Surgical History:     Past Surgical History:   Procedure Laterality Date    CERVICAL SPINE SURGERY N/A 8/21/2019    C6C7 INTERLAMINAR EPIDURAL STEROID INJECTION WITH FLUOROSCOPY performed by Steff Goodwin MD at [address] [place] [address]       Current Medications:     Current Outpatient Medications:     colchicine (COLCRYS) 0.6 MG tablet, TAKE 1 TABLET BY MOUTH TWICE DAILY AS NEEDED FOR GOUT, Disp: 60 tablet, Rfl: 0    tiZANidine (ZANAFLEX) 2 MG tablet, Take 1 tablet by mouth every 8 hours as needed (muscle spasm), Disp: 30 tablet, Rfl: 0    UNKNOWN TO PATIENT, Indications: CBD OIL , Disp: , Rfl:   Allergies:  Patient has no known allergies. Social History:    reports that he has never smoked. He has never used smokeless tobacco. He reports current alcohol use of about 1.0 standard drinks of alcohol per week. He reports that he does not use drugs.   Family History:   Family History   Problem Relation Age of Onset    Diabetes Mother     Cancer Father 66        esophageal    Diabetes Father     Cancer Maternal Grandmother     Cancer Maternal Grandfather        REVIEW OF SYSTEMS: CONSTITUTIONAL: Denies unexplained weight loss, fevers, chills or fatigue  NEUROLOGICAL: Denies unsteady gait or progressive weakness  MUSCULOSKELETAL: Denies joint swelling or redness  GI: Denies nausea, vomiting, diarrhea   : Denies bowel or bladder issues       PHYSICAL EXAM:    Vitals: Temperature 96.9 °F (36.1 °C), resp. rate 12, height 5' 9\" (1.753 m), weight 201 lb (91.2 kg). GENERAL EXAM:  · General Apparence: Patient is adequately groomed with no evidence of malnutrition. · Psychiatric: Orientation: The patient is oriented to time, place and person. The patient's mood and affect are appropriate   · Vascular: Examination reveals no swelling and palpation reveals no tenderness in upper or lower extremities. Good capillary refill. · The lymphatic examination of the neck, axillae and groin reveals all areas to be without enlargement or induration  · Sensation is intact without deficit in the upper and lower extremities to light touch and pinprick  · Coordination of the upper and lower extremities are normal.    CERVICAL EXAMINATION:  · Inspection: Local inspection shows no step-off or bruising. Cervical alignment is normal. No instability is noted. · Palpation and Percussion: No evidence of tenderness at the midline. Paraspinal tenderness is present. There is no paraspinal spasm. Skin:There are no rashes, ulcerations or lesions  · Range of Motion:  limited by 50% in all planes due to pain   · Strength: 5/5 bilateral upper extremities  · Special Tests:   Spurling's positive right and negative left and Moody's are negative bilaterally. Brown and Impingement tests are positive right and negative left. · Skin:There are no rashes, ulcerations or lesions in right & left upper extremities. · Reflexes: Bilaterally triceps, biceps and brachioradialis are 1+. Clonus absent bilaterally at the feet. No pathological reflexes are noted.   · Gait & station: normal, patient ambulates without assistance and no ataxia  · Additional Examinations:  · RIGHT UPPER EXTREMITY:  Inspection/examination of the right upper extremity does not show any tenderness, deformity or injury. Range of motion is unremarkable and pain-free. There is no gross instability. There are no rashes, ulcerations or lesions. Strength and tone are normal. No atrophy or abnormal movements are noted. · LEFT UPPER EXTREMITY: Inspection/examination of the left upper extremity does not show any tenderness, deformity or injury. Range of motion is unremarkable and pain-free. There is no gross instability. There are no rashes, ulcerations or lesions. Strength and tone are normal. No atrophy or abnormal movements are noted. · RIGHT LOWER EXTREMITY: Inspection/examination of the right lower extremity does not show any tenderness, deformity or injury. Range of motion is normal and pain-free. There is no gross instability. There are no rashes, ulcerations or lesions. Strength and tone are normal. No atrophy or abnormal movements are noted. · LEFT LOWER EXTREMITY:  Inspection/examination of the left lower extremity does not show any tenderness, deformity or injury. Range of motion is normal and pain-free. There is no gross instability. There are no rashes, ulcerations or lesions. Strength and tone are normal. No atrophy or abnormal movements are noted. Diagnostic Testing:    MRI CERVICAL SPINE 03/12/2019   CONCLUSION:  A broad spondylotic C5-6 disc displacement with a superimposed small central disc protrusion encroaches  upon the ventral cord and the exiting C6 nerve root sheaths. Mild to moderate bilateral foraminal stenosis  secondary to Luschka joint hypertrophy and facet hypertrophy with C6 root effacement. Moderate left C3-4 foraminal stenosis secondary to facet and Luschka joint hypertrophy with C4 root  effacement.   Straightening and reversal of the cervical lordosis, retrolisthetic microinstability at C5-6 and multilevel facet  and Luschka tony Jackson was instructed to call the office if his symptoms worsen or if new symptoms appear prior to the next scheduled visit. He is specifically instructed to contact the office between now & his scheduled appointment if he has concerns related to his condition or if he needs assistance in scheduling the above tests. He is welcome to call for an appointment sooner if he has any additional concerns or questions. I, Murriel Bumpers, ATC, am scribing for and in the presence of Saurabh Hou PA-C.    10/14/20 9:28 AM Murriel Bumpers, ATC    The physical examination was performed between the patient and Saurabh Hou PA-C All counseling during the appointment was performed between the patient and provider. Luis Hernanedz PA-C, personally performed the services described in this documentation as scribed by Maddy Strange ATC in my presence and it is both accurate and complete. OBINNA Escobar PA-C  Board Certified by the [employer] on Certification of Physician Assistants  [id number]  Partner of [facility] ([facility])           This dictation was performed with a verbal recognition program [facility]) and it was checked for errors. It is possible that there are still dictated errors within this office note. If so, please bring any errors to my attention for an addendum. All efforts were made to ensure that this office note is accurate.

## 2020-10-15 ENCOUNTER — TELEPHONE (OUTPATIENT)
Dept: ORTHOPEDIC SURGERY | Age: 45
End: 2020-10-15

## 2020-10-15 ENCOUNTER — OFFICE VISIT (OUTPATIENT)
Dept: ORTHOPEDIC SURGERY | Age: 45
End: 2020-10-15
Payer: COMMERCIAL

## 2020-10-15 VITALS — WEIGHT: 182.1 LBS | BODY MASS INDEX: 28.58 KG/M2 | TEMPERATURE: 98.4 F | HEIGHT: 67 IN

## 2020-10-15 PROCEDURE — 99203 OFFICE O/P NEW LOW 30 MIN: CPT | Performed by: INTERNAL MEDICINE

## 2020-10-15 RX ORDER — PANTOPRAZOLE SODIUM 20 MG/1
20 TABLET, DELAYED RELEASE ORAL DAILY
COMMUNITY
End: 2021-04-08 | Stop reason: DRUGHIGH

## 2020-10-15 RX ORDER — MELOXICAM 15 MG/1
15 TABLET ORAL DAILY PRN
Qty: 30 TABLET | Refills: 1 | Status: SHIPPED | OUTPATIENT
Start: 2020-10-15 | End: 2020-11-06

## 2020-10-15 NOTE — TELEPHONE ENCOUNTER
Auth: # NPR    Scot Navarrojacquieradha Soler  No ref #   Date: 10/19/2020  Type of SX:  OP  Location: [facility]  CPT: [id number]   DX Code: M54.16    M50.20   M25.511   M67.911  SX area:  C6- C7  Intralaminar NAVEED  Insurance: [address] [place] [address]    CPT: 79672.84   [id number]  X

## 2020-10-15 NOTE — PROGRESS NOTES
PATIENT REACHED   YES__X__NO____    PREOP INSTUCTIONS LEFT ON VM NUMBER_______________  Patient instructed to get their COVID-19 test done as directed by their doctor (5-7 days prior to procedure)  or patient states will get on ___10/14_______. Patient was notified that they need to have an appointment,number to call provided. The day the COVID test is done is considered day one. Instructed to self quarantine after test until DOS. There is a one visitor policy at [facility] for all surgeries and endoscopies. Whether the visitor can stay or will be asked to wait in the car will depend on the current policy and if social distancing can be maintained. The policy is subject to change at any time. Please make sure the visitor has a cell phone that is on,charged and able to accept calls, as this may be the way that the staff communicates with them. Pain management is NO VISITOR policyThe patients ride is expected to remain in the car with a cell phone for communication. If the ride is leaving the hospital grounds please make sure they are back in time for pickup. Have the patient inform the staff on arrival what their rides plans are while the patient is in the facility. At the MAIN there is one visitor allowed. Please note that the visitor policy is subject to change. DATE_10/19/20________ TIME__1020_______ARRIVAL___0920_____PLACE__masc__________  NOTHING TO EAT OR DRINK  AFTER MIDNIGHT THE EVENING PRIOR OR AS INSTRUCTED BY YOUR DR.  Candice Linder NEED A RESPONSIBLE ADULT AGE 18 OR OLDER TO DRIVE YOU HOME  PLEASE BRING INSURANCE CARD. PICTURE ID AND COMPLETE LIST OF MEDS  WEAR LOOSE COMFORTABLE CLOTHING  FOLLOW ANY INSTRUCTIONS YOUR [facility] OFFICE HAS GIVEN YOU,INCLUDING WHAT MEDICATIONS TO TAKE THE AM OF PROCEDURE AND WHEN AND IF YOU NEED TO STOP ANY BLOOD THINNERS.  IF YOU HAVE QUESTIONS REGARDING THIS CALL THE OFFICE  THE GOAL BLOOD SUGAR THE AM OF PROCEDURE  OR LESS ABOVE THAT THE PROCEDURE MAY BE CANCELLED  ANY QUESTIONS CALL YOUR

## 2020-10-15 NOTE — PROGRESS NOTES
Chief Complaint:   Chief Complaint   Patient presents with    Shoulder Pain     right, pain a few months ago when raising tennis racket overhead, pain 6-8/10          History of Present Illness:       Patient is a 54 y.o. male presents with the above complaint. The symptoms began 2 monthsago and started with an injury. The patient describes a sharp, deep pain that does not radiate. The symptoms are intermittent  and are show no change since the onset. He noted the onset of acute pain associated pop with his arm position and extreme forward elevation as part of overhead tennis serve. His shoulder has remained problematic since that time    His past history significant for cervical disc herniation C5/C6 disc displacement with a superimposed small central disc protrusion encroaching upon the ventral cord and the exiting C6 nerve root sheaths-MRI 3/12/2019. He is scheduled undergo elective cervical epidural injection on Monday, 10/19/2020    The symptoms do not show a typical rotator cuff provacative pattern. The pain is deep in the shoulder. There are associated mechanical symptoms localizing to the shoulder that are active at this time. The patient denies symptoms of instability about the shoulder. Treatment to date has included nothing specific. Pain levels 8. The symptoms do not correlate with head and neck movement. The patient denies new onset weakness of the upper extremity. The patient does not have history of remote  shoulder trauma. There is no history or autoimmune disease, inflammatory arthropathy or crystal arthropathy.               Past Medical History:        Past Medical History:   Diagnosis Date    Chronic back pain     Frequent urination     GERD (gastroesophageal reflux disease)     Sleep apnea     with c pap         Past Surgical History:   Procedure Laterality Date    CERVICAL SPINE SURGERY N/A 8/21/2019    C6C7 INTERLAMINAR EPIDURAL STEROID INJECTION WITH FLUOROSCOPY performed by Juan Puri MD at [facility]           Present Medications:         Current Outpatient Medications   Medication Sig Dispense Refill    meloxicam (MOBIC) 15 MG tablet Take 1 tablet by mouth daily as needed for Pain 30 tablet 1    pantoprazole (PROTONIX) 20 MG tablet Take 20 mg by mouth daily      colchicine (COLCRYS) 0.6 MG tablet TAKE 1 TABLET BY MOUTH TWICE DAILY AS NEEDED FOR GOUT 60 tablet 0    tiZANidine (ZANAFLEX) 2 MG tablet Take 1 tablet by mouth every 8 hours as needed (muscle spasm) 30 tablet 0    UNKNOWN TO PATIENT Indications: CBD OIL        No current facility-administered medications for this visit. Allergies:      No Known Allergies     Social History:         Social History     Socioeconomic History    Marital status:      Spouse name: Not on file    Number of children: Not on file    Years of education: Not on file    Highest education level: Not on file   Occupational History    Not on file   Social Needs    Financial resource strain: Not on file    Food insecurity     Worry: Not on file     Inability: Not on file    Transportation needs     Medical: Not on file     Non-medical: Not on file   Tobacco Use    Smoking status: Never Smoker    Smokeless tobacco: Never Used   Substance and Sexual Activity    Alcohol use:  Yes     Alcohol/week: 1.0 standard drinks     Types: 1 Cans of beer per week     Comment: occasionally     Drug use: No    Sexual activity: Yes   Lifestyle    Physical activity     Days per week: Not on file     Minutes per session: Not on file    Stress: Not on file   Relationships    Social connections     Talks on phone: Not on file     Gets together: Not on file     Attends Jewish service: Not on file     Active member of club or organization: Not on file     Attends meetings of clubs or organizations: Not on file     Relationship status: Not on file    Intimate partner violence     Fear of current or ex partner: Not on file     Emotionally abused: Not on file     Physically abused: Not on file     Forced sexual activity: Not on file   Other Topics Concern    Not on file   Social History Narrative    Not on file        Review of Symptoms:    Pertinent items are noted in HPI    Review of systems reviewed from Patient History Form dated on today's date and   available in the patient's chart under the Media tab. Vital Signs:      Vitals:    10/15/20 1435   Temp: 98.4 °F (36.9 °C)        General Exam:     Constitutional: Patient is adequately groomed with no evidence of malnutrition  Mental Status: The patient is oriented to time, place and person. The patient's mood and affect are appropriate. Vascular: Examination reveals no swelling or calf tenderness. Peripheral pulses are palpable and 2+. Lymphatics: no lymphadenopathy of the cervical or axillary regions or upper extremity      Physical Exam: right shoulder      Primary Exam:    Inspection: No deformity atrophy appreciable effusion      Palpation: Mild tenderness diffusely over the clavicle and mild over the [facility] joint      Range of Motion: Full range forward elevation abduction external rotation mild asymmetric tightness and internal rotation pain in extremes overhead      Strength: Normal low-grade resisted pain only in abduction      Special Tests: Impingement sign negative, [place]'s test positive, dynamic external rotation shear test positive for reproduction of his pain      Skin: There are no rashes, ulcerations or lesions. Gait: Nonantalgic      Reflex intact lower     Additional Comments:        Additional Examinations:           Left Upper Extremity: Examination of the left upper extremity does not show any tenderness, deformity or injury. Range of motion is unremarkable. There is no gross instability. There are no rashes, ulcerations or lesions.   Strength and tone are normal.  Neck: Examination of the neck does not show any tenderness, deformity or injury. Range of motion is mildly diminished in flexion extension with associated discomfort symmetric and full range with rotation low-grade discomfort with rotation to the left. There is no gross instability. There are no rashes, ulcerations or lesions. Strength and tone are normal.  Neurologic -Light touch sensation and manual muscle testing is normal C5-C8 . Biceps 1+ and symmetric. No other focal motor or sensory deficits          Office Imaging Results/Procedures PerformedToday:      Radiology:      X-rays obtained and reviewed in office:   Views 3 views   Location right shoulder   Impression glenohumeral joint anatomic normal appearance of the glenohumeral joint space type I acromion morphology no other soft tissue or osseous abnormalities       Office Procedures:     Orders Placed This Encounter   Procedures    XR SHOULDER RIGHT (MIN 2 VIEWS)     Standing Status:   Future     Number of Occurrences:   1     Standing Expiration Date:   10/15/2021     Order Specific Question:   Reason for exam:     Answer:   pain    MRI SHOULDER RIGHT WO CONTRAST     [facility] [place], 10/21/2020, 6:30 pm     Standing Status:   Future     Standing Expiration Date:   10/15/2021     Order Specific Question:   Reason for exam:     Answer:   r/o labral tear/SLAP lesion           Other Outside Imaging and Testing Personally Reviewed:    Xr Shoulder Right (min 2 Views)    Result Date: 10/15/2020  Radiology exam is complete. No Radiologist dictation. Please follow up with ordering provider. Assessment   Impression: . Encounter Diagnoses   Name Primary?     Labral tear of shoulder, right, initial encounter Yes    Acute pain of right shoulder               Plan:       MRI evaluation right shoulder evaluate pathology suspected above  Activity modification labral protocol  Proceed with cervical spine intervention injection as scheduled for Monday next week  PRN use of meloxicam after cervical epidural to manage shoulder pain as necessary      The nature of the finding, probable diagnosis and likely treatment was thoroughly discussed with the patient. The options, risks, complications, alternative treatment as well as some of the differential diagnosis was discussed. The patient was thoroughly informed and all questions were answered. the patient indicated understanding and satisfaction with the discussion. Orders:        Orders Placed This Encounter   Procedures    XR SHOULDER RIGHT (MIN 2 VIEWS)     Standing Status:   Future     Number of Occurrences:   1     Standing Expiration Date:   10/15/2021     Order Specific Question:   Reason for exam:     Answer:   pain    MRI SHOULDER RIGHT WO CONTRAST     [facility] [place], 10/21/2020, 6:30 pm     Standing Status:   Future     Standing Expiration Date:   10/15/2021     Order Specific Question:   Reason for exam:     Answer:   r/o labral tear/SLAP lesion           Disclaimer: \"This note was dictated with voice recognition software. Though review and correction are routine, we apologize for any errors. \"

## 2020-10-16 LAB — SARS-COV-2, PCR: NOT DETECTED

## 2020-10-16 NOTE — RESULT ENCOUNTER NOTE

## 2020-10-19 ENCOUNTER — APPOINTMENT (OUTPATIENT)
Dept: GENERAL RADIOLOGY | Age: 45
End: 2020-10-19
Attending: PHYSICAL MEDICINE & REHABILITATION
Payer: COMMERCIAL

## 2020-10-19 ENCOUNTER — HOSPITAL ENCOUNTER (OUTPATIENT)
Age: 45
Setting detail: OUTPATIENT SURGERY
Discharge: HOME | End: 2020-10-19
Attending: PHYSICAL MEDICINE & REHABILITATION | Admitting: PHYSICAL MEDICINE & REHABILITATION
Payer: COMMERCIAL

## 2020-10-19 VITALS
TEMPERATURE: 97.3 F | HEART RATE: 70 BPM | WEIGHT: 182 LBS | BODY MASS INDEX: 28.56 KG/M2 | HEIGHT: 67 IN | SYSTOLIC BLOOD PRESSURE: 103 MMHG | RESPIRATION RATE: 14 BRPM | DIASTOLIC BLOOD PRESSURE: 86 MMHG | OXYGEN SATURATION: 98 %

## 2020-10-19 PROCEDURE — 2500000003 HC RX 250 WO HCPCS: Performed by: PHYSICAL MEDICINE & REHABILITATION

## 2020-10-19 PROCEDURE — 3610000054 HC PAIN LEVEL 3 BASE (NON-OR): Performed by: PHYSICAL MEDICINE & REHABILITATION

## 2020-10-19 PROCEDURE — 2580000003 HC RX 258: Performed by: PHYSICAL MEDICINE & REHABILITATION

## 2020-10-19 PROCEDURE — 2709999900 HC NON-CHARGEABLE SUPPLY: Performed by: PHYSICAL MEDICINE & REHABILITATION

## 2020-10-19 PROCEDURE — 99152 MOD SED SAME PHYS/QHP 5/>YRS: CPT | Performed by: PHYSICAL MEDICINE & REHABILITATION

## 2020-10-19 PROCEDURE — 6360000002 HC RX W HCPCS: Performed by: PHYSICAL MEDICINE & REHABILITATION

## 2020-10-19 PROCEDURE — 77003 FLUOROGUIDE FOR SPINE INJECT: CPT

## 2020-10-19 RX ORDER — FENTANYL CITRATE 50 UG/ML
INJECTION, SOLUTION INTRAMUSCULAR; INTRAVENOUS
Status: COMPLETED | OUTPATIENT
Start: 2020-10-19 | End: 2020-10-19

## 2020-10-19 RX ORDER — SODIUM CHLORIDE 9 MG/ML
INJECTION INTRAVENOUS
Status: COMPLETED | OUTPATIENT
Start: 2020-10-19 | End: 2020-10-19

## 2020-10-19 RX ORDER — LIDOCAINE HYDROCHLORIDE 10 MG/ML
INJECTION, SOLUTION INFILTRATION; PERINEURAL
Status: COMPLETED | OUTPATIENT
Start: 2020-10-19 | End: 2020-10-19

## 2020-10-19 RX ORDER — BETAMETHASONE SODIUM PHOSPHATE AND BETAMETHASONE ACETATE 3; 3 MG/ML; MG/ML
INJECTION, SUSPENSION INTRA-ARTICULAR; INTRALESIONAL; INTRAMUSCULAR; SOFT TISSUE
Status: COMPLETED | OUTPATIENT
Start: 2020-10-19 | End: 2020-10-19

## 2020-10-19 RX ORDER — MIDAZOLAM HYDROCHLORIDE 1 MG/ML
INJECTION INTRAMUSCULAR; INTRAVENOUS
Status: COMPLETED | OUTPATIENT
Start: 2020-10-19 | End: 2020-10-19

## 2020-10-19 ASSESSMENT — PAIN - FUNCTIONAL ASSESSMENT: PAIN_FUNCTIONAL_ASSESSMENT: 0-10

## 2020-10-19 ASSESSMENT — PAIN DESCRIPTION - DESCRIPTORS
DESCRIPTORS: BURNING;ACHING;SPASM
DESCRIPTORS: ACHING;DULL;SPASM
DESCRIPTORS: ACHING;DULL;SPASM

## 2020-10-19 ASSESSMENT — PAIN SCALES - GENERAL
PAINLEVEL_OUTOF10: 4
PAINLEVEL_OUTOF10: 6

## 2020-10-19 ASSESSMENT — PAIN DESCRIPTION - LOCATION
LOCATION: NECK
LOCATION: NECK

## 2020-10-19 ASSESSMENT — PAIN DESCRIPTION - PAIN TYPE
TYPE: CHRONIC PAIN
TYPE: CHRONIC PAIN

## 2020-10-19 ASSESSMENT — PAIN DESCRIPTION - FREQUENCY
FREQUENCY: CONTINUOUS
FREQUENCY: CONTINUOUS

## 2020-10-19 ASSESSMENT — PAIN DESCRIPTION - ORIENTATION
ORIENTATION: MID
ORIENTATION: MID

## 2020-10-19 NOTE — H&P
HISTORY AND PHYSICAL/PRE-SEDATION ASSESSMENT    Patient:  Major Ro   :  1975  Medical Record No.:  [id number]   Date:  10/19/2020  Physician:  Denae Estrada M.D. Facility: [address] [place] [address]    HISTORY OF PRESENT ILLNESS:                 The patient is a 54 y.o. male whom presents with neck and arm pain. Review of the imaging and physical exam of the patient confirmed the pre-procedure diagnosis. After a thorough discussion of risks, benefits and alternatives informed consent was obtained. Past Medical History:   Past Medical History:   Diagnosis Date    Chronic back pain     Frequent urination     GERD (gastroesophageal reflux disease)     Sleep apnea     with c pap      Past Surgical History:     Past Surgical History:   Procedure Laterality Date    CERVICAL SPINE SURGERY N/A 2019    C6C7 INTERLAMINAR EPIDURAL STEROID INJECTION WITH FLUOROSCOPY performed by Denae Estrada MD at [facility]    TONSILLECTOMY       Current Medications:   Prior to Admission medications    Medication Sig Start Date End Date Taking? Authorizing Provider   pantoprazole (PROTONIX) 20 MG tablet Take 20 mg by mouth daily   Yes Historical Provider, MD   colchicine (COLCRYS) 0.6 MG tablet TAKE 1 TABLET BY MOUTH TWICE DAILY AS NEEDED FOR GOUT 20  Yes ANA CRISTINA Henderson - CNP   UNKNOWN TO PATIENT Indications: CBD OIL    Yes Historical Provider, MD   meloxicam (MOBIC) 15 MG tablet Take 1 tablet by mouth daily as needed for Pain 10/15/20   Lincoln Rocha MD   tiZANidine (ZANAFLEX) 2 MG tablet Take 1 tablet by mouth every 8 hours as needed (muscle spasm) 20   Delilah Sanderson MD     Allergies:  Patient has no known allergies. Social History:    reports that he has never smoked. He has never used smokeless tobacco. He reports current alcohol use of about 1.0 standard drinks of alcohol per week.  He reports that he does not use drugs. Family History:   Family History   Problem Relation Age of Onset    Diabetes Mother     Cancer Father 66        esophageal    Diabetes Father     Cancer Maternal Grandmother     Cancer Maternal Grandfather        Vitals: Blood pressure 123/86, pulse 72, temperature 97.3 °F (36.3 °C), temperature source Temporal, resp. rate 14, height 5' 7\" (1.702 m), weight 182 lb (82.6 kg), SpO2 99 %. PHYSICAL EXAM:including affected areas  HENT: Airway patent and reviewed  Cardiovascular: Normal rate, regular rhythm, normal heart sounds. Pulmonary/Chest: No wheezes. No rhonchi. No rales. Abdominal: Soft. Bowel sounds are normal. No distension. Extremities: Moves all extremities equally  Cervical and Lumbar Spine: Painful range of motion, no midline tenderness       Diagnosis:Cervical radiculopathy  M54.12  M50.20  M25.511  M67.911    Plan: Proceed with planned procedure      ASA CLASS:         []   I. Normal, healthy adult           [x]   II.  Mild systemic disease            []   III. Severe systemic disease      Mallampati: Mallampati Class II - (soft palate, fauces & uvula are visible)      Sedation plan:   [x]  Local              []  Minimal                  []  General anesthesia    Patient's condition acceptable for planned procedure/sedation. Post Procedure Plan   Return to same level of care   ______________________     The patient was counseled at length about the risks of leny Covid-19 in the jaguar-operative and post-operative states including the recovery window of their procedure. The patient was made aware that leny Covid-19 after a surgical procedure may worsen their prognosis for recovering from the virus and lend to a higher morbidity and or mortality risk. The patient was given the options of postponing their procedure. All of the risks, benefits, and alternatives were discussed. The patient does wish to proceed with the procedure.      The risks and benefits as well as alternatives to the procedure have been discussed with the patient and or family. The patient and or next of kin understands and agrees to proceed.     Jm Linder M.D.

## 2020-10-19 NOTE — OP NOTE
Patient:  Mike Linda  YOB: 1975  Medical Record #:  [id number]   Place: [address] [place] [address]  Date:  10/19/2020   Physician:  Taz Yo MD, MAURI    Procedure:  Cervical Epidural Interlaminar Steroid Injection  C5 - C6    NAVEED #1    Pre-Procedure Diagnosis:  Cervical radiculopathy    Post-Procedure Diagnosis: Same    Sedation: Local with 1% Lidocaine 3 ml and 1 mg of IV Versed with 25 mcg of IV Fentanyl    EBL: None    Complications: None    Procedure Summary:    The patient was seen in the office for complaints of neck and arm pain. Review of the imaging and physical exam of the patient confirmed the pre-procedure diagnosis. After a thorough discussion of risks, benefits and alternatives informed consent was obtained. The patient was brought to the procedure suite and placed in the prone position. The skin overlying the cervical spine was prepped and draped in the usual sterile fashion. Using fluoroscopic guidance, the C5 - C6 interlaminar space was identified. Through anesthetized skin, a 22 gauge Touhy needle was advanced into the epidural space using continuous loss of resistance to saline technique. Isovue M300 was instilled and an epidurogram was noted without evidence of vascular or intrathecal spread. Following which, 5 ml of a solution containing preservative free normal saline and 15 mg of betamethasone was instilled. The needle was removed and a band-aid applied. The patient was transferred to the post-operative suite in stable condition.

## 2020-10-19 NOTE — PROGRESS NOTES
IV discontinued, catheter intact, and dressing applied. Procedural dressing dry and intact. Bilateral upper extremities equal in strength. Discharge instructions reviewed with patient or responsible adult, signed and copy given. All home medications have been reviewed. All questions answered and patient or responsible adult verbalized understanding.   PAIN LEVEL AT DISCHARGE _5____

## 2020-10-27 ENCOUNTER — OFFICE VISIT (OUTPATIENT)
Dept: ORTHOPEDIC SURGERY | Age: 45
End: 2020-10-27
Payer: COMMERCIAL

## 2020-10-27 VITALS — HEIGHT: 67 IN | BODY MASS INDEX: 28.58 KG/M2 | TEMPERATURE: 98.5 F | WEIGHT: 182.1 LBS

## 2020-10-27 PROCEDURE — 99213 OFFICE O/P EST LOW 20 MIN: CPT | Performed by: INTERNAL MEDICINE

## 2020-10-27 NOTE — PROGRESS NOTES
Chief Complaint:   Chief Complaint   Patient presents with    Shoulder Pain          History of Present Illness:       Patient is a 54 y.o. male returns follow up for the above complaint. The patient was last seen approximately 1 weeksago. The symptoms show no change since the last visit. The patient has had further testing for the problem. MRI completed in the interim as outlined below in detail    He continues experiencing deep aching pain in the shoulder he denies any mechanical symptoms    Status post SURAJ 10/19/2020    No correlation of shoulder pain with head and neck range of motion    He denies any subjective instability about the right shoulder no appreciable change with meloxicam with respect to pain     Past Medical History:        Past Medical History:   Diagnosis Date    Chronic back pain     Frequent urination     GERD (gastroesophageal reflux disease)     Sleep apnea     with c pap        Present Medications:         Current Outpatient Medications   Medication Sig Dispense Refill    pantoprazole (PROTONIX) 20 MG tablet Take 20 mg by mouth daily      meloxicam (MOBIC) 15 MG tablet Take 1 tablet by mouth daily as needed for Pain 30 tablet 1    colchicine (COLCRYS) 0.6 MG tablet TAKE 1 TABLET BY MOUTH TWICE DAILY AS NEEDED FOR GOUT 60 tablet 0    tiZANidine (ZANAFLEX) 2 MG tablet Take 1 tablet by mouth every 8 hours as needed (muscle spasm) 30 tablet 0    UNKNOWN TO PATIENT Indications: CBD OIL        No current facility-administered medications for this visit.           Allergies:      No Known Allergies        Review of Systems:    Pertinent items are noted in HPI         Vital Signs:      Vitals:    10/27/20 1124   Temp: 98.5 °F (36.9 °C)        General Exam:     Constitutional: Patient is adequately groomed with no evidence of malnutrition    Physical Exam: right shoulder      Primary Exam:    Inspection: No deformity atrophy appreciable effusion      Palpation: No focal tenderness      Range of Motion: Stable unchanged from previous      Strength: Purposely not assessed      Special Tests: Catherine test positive, jerk test negative, passive translation load-and-shift testing. To anterior and posterior only low-grade discomfort with posterior translation without mechanical signs      Skin: There are no rashes, ulcerations or lesions. Gait: Nonantalgic    Neurovascular - non focal and intact       Additional Comments:        Additional Examinations:                   Office Imaging Results/Procedures PerformedToday:          Office Procedures:   No orders of the defined types were placed in this encounter. Other Outside Imaging and Testing Personally Reviewed:    Mri Upper Extremity W Jt Wo Contrast    Result Date: 10/22/2020  [facility] [place] [id number] #: [id number] Procedure: MR Right Shoulder joint w/o Contrast ; Reason for Exam: Labral tear of right shoulder This document is confidential medical information. Unauthorized disclosure or use of this information is prohibited by law. If you are not the intended recipient of this document, please advise us by calling immediately [phone]. [employer] [place] [address] Patient Name: Adal Palma Case ID: [id number] Patient : 1975 Referring Physician: Nacho Jennings MD Exam Date: 10/21/2020 Exam Description: MR Right Shoulder joint w/o Contrast HISTORY:  Labrum tear. TECHNICAL FACTORS:  Long- and short-axis fat- and water-weighted images were performed. COMPARISON:  None. FINDINGS:  AC arthropathy is mild. Mild tendinosis supraspinatus, infraspinatus and subscapularis insertions. Mild peritendinobursitis. Biceps long head tendon is intact. Elongated flap tear posterior labrum. Superior labrum is frayed. Capsulitis. Pseudocyst posterior greater tuberosity. No soft tissue mass. CONCLUSION: 1. Elongated flap tear posterior labrum.   Frayed superior

## 2020-11-04 ENCOUNTER — HOSPITAL ENCOUNTER (OUTPATIENT)
Dept: PHYSICAL THERAPY | Age: 45
Setting detail: THERAPIES SERIES
Discharge: HOME | End: 2020-11-04
Payer: COMMERCIAL

## 2020-11-04 PROCEDURE — 97161 PT EVAL LOW COMPLEX 20 MIN: CPT

## 2020-11-04 PROCEDURE — 97140 MANUAL THERAPY 1/> REGIONS: CPT

## 2020-11-04 PROCEDURE — 97110 THERAPEUTIC EXERCISES: CPT

## 2020-11-04 NOTE — FLOWSHEET NOTE
[facility]    Physical Therapy Treatment Note/ Progress Report:     Date:  2020    Patient Name:  Arianna Pritchett    :  1975  MRN: [id number]  Medical/Treatment Diagnosis Information:  Diagnosis: S43.431D (R labral tear)  Treatment Diagnosis: R shoulder pain  Insurance/Certification information:  PT Insurance Information: UMR/[place]OP [place]  Physician Information:  Referring Practitioner: Dr. Vidhi Sears of care signed (Y/N):     Date of Patient follow up with Physician:      Progress Report: []  Yes  [x]  No     Functional Scale: SPADI 71%     Date: 20    Date Range for reporting period:  Beginning:    Ending:      Progress report due (10 Rx/or 30 days whichever is less): 94    Recertification due (POC duration/ or 90 days whichever is less):  20    Visit # Insurance Allowable Auth Needed   1 [place] []Yes    [x]No     Pain level:  8/10     SUBJECTIVE:  See eval    OBJECTIVE: See eval   Observation:    Test measurements:      RESTRICTIONS/PRECAUTIONS: known posterior labral tear- conservative treatment     Exercises/Interventions:   Therapeutic Ex Wt / Resistance Sets/sec Reps Notes   Table slides  2 10    Scapular retractions  scap elevation/depression   3 10    Shoulder isometrics  10 10s ER/IR                               Manual Intervention       Shld /GH Mobs       Post Cap mobs       Thoracic/Rib manipulation       CT MT/Mobs       PROM MT  8 min                    NMR re-education                                                                   Therapeutic Exercise and NMR EXR  [x] (37355) Provided verbal/tactile cueing for activities related to strengthening, flexibility, endurance, ROM  for improvements in scapular, scapulothoracic and UE control with self care, reaching, carrying, lifting, house/yardwork, driving/computer work.    [] (69132) Provided verbal/tactile cueing for activities related to improving balance, coordination, kinesthetic sense, posture, motor skill, proprioception  to assist with  scapular, scapulothoracic and UE control with self care, reaching, carrying, lifting, house/yardwork, driving/computer work. Therapeutic Activities:    [] (29087 or 99986) Provided verbal/tactile cueing for activities related to improving balance, coordination, kinesthetic sense, posture, motor skill, proprioception and motor activation to allow for proper function of scapular, scapulothoracic and UE control with self care, carrying, lifting, driving/computer work.      Home Exercise Program:    [x] (87867) Reviewed/Progressed HEP activities related to strengthening, flexibility, endurance, ROM of scapular, scapulothoracic and UE control with self care, reaching, carrying, lifting, house/yardwork, driving/computer work  [] (70346) Reviewed/Progressed HEP activities related to improving balance, coordination, kinesthetic sense, posture, motor skill, proprioception of scapular, scapulothoracic and UE control with self care, reaching, carrying, lifting, house/yardwork, driving/computer work      Manual Treatments:  PROM / STM / Oscillations-Mobs:  G-I, II, III, IV (PA's, Inf., Post.)  [x] (03967) Provided manual therapy to mobilize soft tissue/joints of cervical/CT, scapular GHJ and UE for the purpose of modulating pain, promoting relaxation,  increasing ROM, reducing/eliminating soft tissue swelling/inflammation/restriction, improving soft tissue extensibility and allowing for proper ROM for normal function with self care, reaching, carrying, lifting, house/yardwork, driving/computer work    Modalities:      Charges:  Timed Code Treatment Minutes: eval + 25 min   Total Treatment Minutes: 45 min       [x] EVAL (LOW) 16441 (typically 20 minutes face-to-face)  [] EVAL (MOD) 02598 (typically 30 minutes face-to-face)  [] EVAL (HIGH) 15960 (typically 45 minutes face-to-face)  [] RE-EVAL     [x] LA(54937) x     [] IONTO (37619)  [] NMR (17805) x     [] VASO (46146)  [x] Manual (74126) x     [] Other:  [] TA (01762)x     [] Mech Traction (68933)  [] ES(attended) (48438)     [] ES (un) (86542):       GOALS:  Patient stated goal: \"no shoulder pain\"   [] Progressing: [] Met: [x] Not Met: [] Adjusted    Therapist goals for Patient:   Short Term Goals: To be achieved in: 2 weeks  1. Independent in HEP and progression per patient tolerance, in order to prevent re-injury. [] Progressing: [] Met: [x] Not Met: [] Adjusted  2. Patient will have a decrease in pain to facilitate improvement in movement, function, and ADLs as indicated by Functional Deficits. [] Progressing: [] Met: [x] Not Met: [] Adjusted    Long Term Goals: To be achieved in: 8 weeks  1. Disability index score of 15% or less for the DASH to assist with reaching prior level of function. [] Progressing: [] Met: [x] Not Met: [] Adjusted  2. Patient will demonstrate increased AROM to [place] [place] to allow for proper joint functioning as indicated by patients Functional Deficits. [] Progressing: [] Met: [x] Not Met: [] Adjusted  3. Patient will demonstrate an increase in Strength to [place] [place] to allow for proper functional mobility as indicated by patients Functional Deficits. [] Progressing: [] Met: [x] Not Met: [] Adjusted  4. Patient will return to all functional activities without increased symptoms or restriction. [] Progressing: [] Met: [x] Not Met: [] Adjusted  5. Pt will reach behind back pain free. [] Progressing: [] Met: [x] Not Met: [] Adjusted     Progression Towards Functional goals:  [] Patient is progressing as expected towards functional goals listed. [] Progression is slowed due to complexities listed. [] Progression has been slowed due to co-morbidities.   [x] Plan just implemented, too soon to assess goals progression  [] Other:     ASSESSMENT:  See eval    Treatment/Activity Tolerance:  [x] Patient tolerated treatment well [] Patient limited by evans  []

## 2020-11-04 NOTE — PLAN OF CARE
[facility]  [address] [address]  Phone [phone]   Fax [phone]                                                       Physical Therapy Certification    Dear Referring Practitioner: Dr. Link Corbin,    We had the pleasure of evaluating the following patient for physical therapy services at [address] [place] [address]. A summary of our findings can be found in the initial assessment below. This includes our plan of care. If you have any questions or concerns regarding these findings, please do not hesitate to contact me at the office phone number checked above. Thank you for the referral.       Physician Signature:_______________________________Date:__________________  By signing above (or electronic signature), therapists plan is approved by physician      Patient: Kayy Carvajal   : 1975   MRN: [id number]  Referring Physician: Referring Practitioner: Dr. Link Corbin      Evaluation Date: 2020      Medical Diagnosis Information:  Diagnosis: Q22.352Q (R labral tear)   Treatment Diagnosis: R shoulder pain                                         Insurance information: PT Insurance Information: UMR/[place]OP [place]    Precautions/ Contra-indications: R labral tear     C-SSRS Triggered by Intake questionnaire (Past 2 wk assessment):   [x] No, Questionnaire did not trigger screening.   [] Yes, Patient intake triggered further evaluation      [] C-SSRS Screening completed  [] PCP notified via Plan of Care  [] Emergency services notified     Latex Allergy:  [x]NO      []YES  Preferred Language for Healthcare:   [x]English       []Other:      SUBJECTIVE: Patient stated complaint: Couple months ago was playing tennis with daughter and felt a pop with an OH swing, and pain has progressively worsened. Saw  Brain Pippins MRI showed torn labrum, conservative care, possible surgical route.  Prescribed Meloxicam, no relief. NOW constant pain at rest, reports he feels the shoulder is unsupported. Notes a cramp until he changes position. Inability to reach [place], behind back, behind head. Wakes at night maricruz with WB. Hx of vertigo    Relevant Medical History: none   Functional Disability Index: SPADI 71%    Pain Scale: 7/10  Easing factors: position change, ice  Provocative factors: see above     Type: [x]Constant   []Intermittent  []Radiating [x]Localized []other:     Numbness/Tingling: denies in hand    Occupation/School: desk job     Living Status/Prior Level of Function: Independent with ADLs and IADLs    OBJECTIVE:     CERV ROM     Cervical Flexion     Cervical Extension     Cervical SB     Cervical rotation          ROM Left Right   Shoulder Flex  160*   Shoulder Abd  130*   Shoulder ER  NT   Shoulder IR  NT                  Strength  Left Right   Shoulder Flex 4 3+   Shoulder Scap 4 3+   Shoulder ER  4   Shoulder IR  4               Reflexes/Sensation:    [x]Dermatomes/Myotomes intact    [x]Reflexes equal and normal bilaterally   []Other:    Joint mobility:    [x]Normal    []Hypo   []Hyper    Palpation: Moderate tenderness R biceps tendon and muscle belly    Functional Mobility/Transfers: WNL    Posture: inc scapular protraction with rounded shoulders; pt visibly uncomfortable with multiple posture change     Bandages/Dressings/Incisions: WNL    Gait: (include devices/WB status): WNL    Orthopedic Special Tests:    Normal Abnormal N/A Comments   Painful Arc [] [x] []    Resisted ER [x] [] []    Brown-Gigi [] [] []    Champagne Toast test [] [] []    Drop arm test [] [] []    ER lag sign [] [] []    IR lift off [x] [] []    Sugar Test [] [] []    Lennice Crews [] [] []    Speed's [] [] []    Apprehension [x] [] []                                  [x] Patient history, allergies, meds reviewed. Medical chart reviewed. See intake form.      Review Of Systems (ROS):  [x]Performed Review of systems (Integumentary, CardioPulmonary, Neurological) by intake and observation. Intake form has been scanned into medical record. Patient has been instructed to contact their primary care physician regarding ROS issues if not already being addressed at this time. Co-morbidities/Complexities (which will affect course of rehabilitation):   [x]None           Arthritic conditions   []Rheumatoid arthritis (M05.9)  []Osteoarthritis (M19.91)   Cardiovascular conditions   []Hypertension (I10)  []Hyperlipidemia (E78.5)  []Angina pectoris (I20)  []Atherosclerosis (I70)   Musculoskeletal conditions   []Disc pathology   []Congenital spine pathologies   []Prior surgical intervention  []Osteoporosis (M81.8)  []Osteopenia (M85.8)   Endocrine conditions   []Hypothyroid (E03.9)  []Hyperthyroid Gastrointestinal conditions   []Constipation (N42.30)   Metabolic conditions   []Morbid obesity (E66.01)  []Diabetes type 1(E10.65) or 2 (E11.65)   []Neuropathy (G60.9)     Pulmonary conditions   []Asthma (J45)  []Coughing   []COPD (J44.9)   Psychological Disorders  []Anxiety (F41.9)  []Depression (F32.9)   []Other:   []Other:          Barriers to/and or personal factors that will affect rehab potential:              [x]Age  [x]Sex              [x]Motivation/Lack of Motivation                        []Co-Morbidities              []Cognitive Function, education/learning barriers              []Environmental, home barriers              [x]profession/work barriers  []past PT/medical experience  []other:  Justification:      Falls Risk Assessment (30 days):   [x] Falls Risk assessed and no intervention required.   [] Falls Risk assessed and Patient requires intervention due to being higher risk   TUG score (>12s at risk):     [] Falls education provided, including        ASSESSMENT:  Functional Impairments   []Noted spinal or UE joint hypomobility   []Noted spinal or UE joint hypermobility   [x]Decreased UE functional ROM   [x]Decreased UE functional strength   []Abnormal reflexes/sensation/myotomal/dermatomal deficits   [x]Decreased RC/scapular/core strength and neuromuscular control   []other:      Functional Activity Limitations (from functional questionnaire and intake)   [x]Reduced ability to tolerate prolonged functional positions   []Reduced ability or difficulty with changes of positions or transfers between positions   []Reduced ability to maintain good posture and demonstrate good body mechanics with sitting, bending, and lifting   [x] Reduced ability or tolerance with driving and/or computer work   []Reduced ability to sleep   []Reduced ability to perform lifting, reaching, carrying tasks   [x]Reduced ability to tolerate impact through UE   []Reduced ability to reach behind back   [x]Reduced ability to  or hold objects   [x]Reduced ability to throw or toss an object   []other:    Participation Restrictions   []Reduced participation in self care activities   [x]Reduced participation in home management activities   [x]Reduced participation in work activities   [x]Reduced participation in social activities. [x]Reduced participation in sport/recreation activities. Classification:   []Signs/symptoms consistent with post-surgical status including decreased ROM, strength and function.   []Signs/symptoms consistent with joint sprain/strain   []Signs/symptoms consistent with shoulder impingement   []Signs/symptoms consistent with shoulder/elbow/wrist tendinopathy   []Signs/symptoms consistent with Rotator cuff tear   [x]Signs/symptoms consistent with labral tear   []Signs/symptoms consistent with postural dysfunction    []Signs/symptoms consistent with Glenohumeral IR Deficit - <45 degrees   []Signs/symptoms consistent with facet dysfunction of cervical/thoracic spine    []Signs/symptoms consistent with pathology which may benefit from Dry needling     []other:     Prognosis/Rehab Potential:      []Excellent   [x]Good    []Fair   []Poor    Tolerance of evaluation/treatment:    [x]Excellent   []Good    []Fair   []Poor    Physical Therapy Evaluation Complexity Justification  [] A history of present problem with:  [] no personal factors and/or comorbidities that impact the plan of care;  [x]1-2 personal factors and/or comorbidities that impact the plan of care  []3 personal factors and/or comorbidities that impact the plan of care  [] An examination of body systems using standardized tests and measures addressing any of the following: body structures and functions (impairments), activity limitations, and/or participation restrictions;:  [x] a total of 1-2 or more elements   [] a total of 3 or more elements   [] a total of 4 or more elements   [] A clinical presentation with:  [x] stable and/or uncomplicated characteristics   [] evolving clinical presentation with changing characteristics  [] unstable and unpredictable characteristics;   [] Clinical decision making of [x] low, [] moderate, [] high complexity using standardized patient assessment instrument and/or measurable assessment of functional outcome. [x] EVAL (LOW) 28846 (typically 30 minutes face-to-face)  [] EVAL (MOD) 15888 (typically 30 minutes face-to-face)  [] EVAL (HIGH) 98059 (typically 45 minutes face-to-face)  [] RE-EVAL     PLAN:  Frequency/Duration:  1-2 days per week for 8 Weeks:  INTERVENTIONS:  [x] Therapeutic exercise including: strength training, ROM, for Upper extremity and core   [x]  NMR activation and proprioception for UE, scap and Core   [x] Manual therapy as indicated for shoulder, scapula and spine to include: Dry Needling/IASTM, STM, PROM, Gr I-IV mobilizations, manipulation. [x] Modalities as needed that may include: thermal agents, E-stim, Biofeedback, US, iontophoresis as indicated  [x] Patient education on joint protection, postural re-education, activity modification, progression of HEP.     HEP instruction: (see scanned forms)    GOALS:  Patient stated goal: \"no shoulder pain\"   [] Progressing: [] Met: [x] Not Met: [] Adjusted    Therapist goals for Patient:   Short Term Goals: To be achieved in: 2 weeks  1. Independent in HEP and progression per patient tolerance, in order to prevent re-injury. [] Progressing: [] Met: [x] Not Met: [] Adjusted  2. Patient will have a decrease in pain to facilitate improvement in movement, function, and ADLs as indicated by Functional Deficits. [] Progressing: [] Met: [x] Not Met: [] Adjusted    Long Term Goals: To be achieved in: 8 weeks  1. Disability index score of 15% or less for the DASH to assist with reaching prior level of function. [] Progressing: [] Met: [x] Not Met: [] Adjusted  2. Patient will demonstrate increased AROM to [employer] [place] to allow for proper joint functioning as indicated by patients Functional Deficits. [] Progressing: [] Met: [x] Not Met: [] Adjusted  3. Patient will demonstrate an increase in Strength to [employer] [facility] [place] to allow for proper functional mobility as indicated by patients Functional Deficits. [] Progressing: [] Met: [x] Not Met: [] Adjusted  4. Patient will return to all functional activities without increased symptoms or restriction. [] Progressing: [] Met: [x] Not Met: [] Adjusted  5. Pt will reach behind back pain free. [] Progressing: [] Met: [x] Not Met: [] Adjusted     Electronically signed by:   Ann Rachel PT

## 2020-11-06 ENCOUNTER — OFFICE VISIT (OUTPATIENT)
Dept: FAMILY MEDICINE CLINIC | Age: 45
End: 2020-11-06
Payer: COMMERCIAL

## 2020-11-06 ENCOUNTER — NURSE TRIAGE (OUTPATIENT)
Dept: OTHER | Facility: CLINIC | Age: 45
End: 2020-11-06

## 2020-11-06 VITALS
HEART RATE: 83 BPM | TEMPERATURE: 97.9 F | OXYGEN SATURATION: 98 % | SYSTOLIC BLOOD PRESSURE: 118 MMHG | BODY MASS INDEX: 29.03 KG/M2 | DIASTOLIC BLOOD PRESSURE: 80 MMHG | HEIGHT: 67 IN | WEIGHT: 185 LBS

## 2020-11-06 PROCEDURE — 99214 OFFICE O/P EST MOD 30 MIN: CPT | Performed by: NURSE PRACTITIONER

## 2020-11-06 RX ORDER — TIZANIDINE 2 MG/1
2-4 TABLET ORAL EVERY 8 HOURS PRN
Qty: 30 TABLET | Refills: 0 | Status: SHIPPED | OUTPATIENT
Start: 2020-11-06 | End: 2021-04-08

## 2020-11-06 RX ORDER — METHYLPREDNISOLONE 4 MG/1
TABLET ORAL
Qty: 1 KIT | Refills: 0 | Status: SHIPPED | OUTPATIENT
Start: 2020-11-06 | End: 2020-11-12

## 2020-11-06 ASSESSMENT — ENCOUNTER SYMPTOMS
CONSTIPATION: 0
SHORTNESS OF BREATH: 0
APNEA: 1
DIARRHEA: 0
BACK PAIN: 1
BLOOD IN STOOL: 0

## 2020-11-06 NOTE — LETTER
[facility]  [address] [place] [address],[address]  Phone: [phone]  Fax: [phone]    ANA CRISTINA Soni CNP        November 6, 2020     Patient: Cleo Oliveira   YOB: 1975   Date of Visit: 11/6/2020       To Whom It May Concern:     Cleo Oliveira was seen in the office today 11/6/2020. He may return to work on 11/9/2020. If you have any questions or concerns, please don't hesitate to call.     Sincerely,          ANA CRISTINA Soni CNP

## 2020-11-06 NOTE — PROGRESS NOTES
[facility]ACT[facility]  [facility]  [address] [place] [address] [address]: [phone]         2020     Latonya Hudson (:  1975) is a 54 y.o. male, here for evaluation of the following medical concerns:    Chief Complaint   Patient presents with    Shoulder Pain    Back Pain    Neck Pain        HPI  Neck pain  A little numbness in right hand  Does get headaches  Had an epidural (has had x2)  1st one was helpful with seeing a chiropractor  2nd one not as helpful- only feels better for a short time after chiropractor  Zanaflex has been helpful in the past  Has an upcoming appointment with Ortho     Shoulder pain- right  Was playing tennis with his daughter  Skinny Valenzuela it pop  Achy x a couple months  Leaning and hanging shoulder- helps  Had MRI- labral tear- started PT last Wednesday  Injection is not covered by insurance  Tried mobic- not helpful  Ice- helps; heat does not help    Back pain- lower back- left side  Got bad the last couple days  Almost 1 year ago- had lipoma removed- could barely get out of the car x 1 month    DANGELO  Using cpap  Sometimes feels like he feels better  Does not snore anymore  Is getting up all night long with pain  GERD is better    Started a new job this week- cannot sit for hours; can't use a mouse    Review of Systems   Constitutional: Positive for activity change. Negative for chills and fever. Respiratory: Positive for apnea. Negative for shortness of breath. Cardiovascular: Negative for chest pain. Gastrointestinal: Negative for blood in stool, constipation and diarrhea. Genitourinary: Positive for frequency. No incontinence   Musculoskeletal: Positive for back pain and neck pain. Shoulder pain   Neurological: Positive for weakness and headaches. Negative for dizziness. Psychiatric/Behavioral: Positive for sleep disturbance. Prior to Visit Medications    Medication Sig Taking?  Authorizing Provider   tiZANidine (ZANAFLEX) 2 MG tablet Take 1-2 tablets by mouth every 8 hours as needed (muscle spasm) Yes Lety Blight, APRN - CNP   methylPREDNISolone (MEDROL DOSEPACK) 4 MG tablet Take by mouth. Yes Lety Blight, APRN - CNP   pantoprazole (PROTONIX) 20 MG tablet Take 20 mg by mouth daily  Historical Provider, MD   colchicine (COLCRYS) 0.6 MG tablet TAKE 1 TABLET BY MOUTH TWICE DAILY AS NEEDED FOR GOUT  Lety Blight, APRN - CNP   UNKNOWN TO PATIENT Indications: CBD OIL   Historical Provider, MD        Social History     Tobacco Use    Smoking status: Never Smoker    Smokeless tobacco: Never Used   Substance Use Topics    Alcohol use: Yes     Alcohol/week: 1.0 standard drinks     Types: 1 Cans of beer per week     Comment: occasionally         Vitals:    11/06/20 0844   BP: 118/80   Site: Left Upper Arm   Position: Sitting   Cuff Size: Medium Adult   Pulse: 83   Temp: 97.9 °F (36.6 °C)   TempSrc: Infrared   SpO2: 98%   Weight: 185 lb (83.9 kg)   Height: 5' 7\" (1.702 m)     Estimated body mass index is 28.98 kg/m² as calculated from the following:    Height as of this encounter: 5' 7\" (1.702 m). Weight as of this encounter: 185 lb (83.9 kg). Physical Exam  Vitals signs reviewed. Constitutional:       Appearance: Normal appearance. HENT:      Head: Normocephalic. Cardiovascular:      Rate and Rhythm: Normal rate and regular rhythm. Pulses: Normal pulses. Heart sounds: Normal heart sounds, S1 normal and S2 normal.   Pulmonary:      Effort: Pulmonary effort is normal.      Breath sounds: Normal breath sounds and air entry. Musculoskeletal:      Right shoulder: He exhibits decreased range of motion and tenderness. He exhibits normal pulse. Cervical back: He exhibits decreased range of motion and tenderness. He exhibits normal pulse. Lumbar back: He exhibits tenderness. Neurological:      Mental Status: He is alert.    Psychiatric:         Mood and Affect: Mood normal. ASSESSMENT/PLAN:  1. Cervical radiculopathy  Stable;  Restart zanaflex. Begin medrol dose pack. Encouraged patient to ice PRN. Has an upcoming appointment with Ortho. 2. Labral tear of shoulder, right, sequela  Stable;  Continue with PT and Ortho. 3. Chronic left-sided low back pain, unspecified whether sciatica present  Stable;  Restart zanaflex and begin medrol dose pack. 4. Obstructive sleep apnea (adult) (pediatric)  Stable;  Continue to wear cpap. Follow Up:     No follow-ups on file.

## 2020-11-06 NOTE — TELEPHONE ENCOUNTER
Reason for Disposition   Numbness in a leg or foot (i.e., loss of sensation)    Answer Assessment - Initial Assessment Questions  1. ONSET: \"When did the pain begin? \"       2 days ago  2. LOCATION: \"Where does it hurt? \" (upper, mid or lower back)     Left foot   3. SEVERITY: \"How bad is the pain? \"  (e.g., Scale 1-10; mild, moderate, or severe)    - MILD (1-3): doesn't interfere with normal activities     - MODERATE (4-7): interferes with normal activities or awakens from sleep     - SEVERE (8-10): excruciating pain, unable to do any normal activities       Severe   4. PATTERN: \"Is the pain constant? \" (e.g., yes, no; constant, intermittent)       Constant   5. RADIATION: \"Does the pain shoot into your legs or elsewhere? \"      Unknown - in shoulders - back - foot  6. CAUSE:  \"What do you think is causing the back pain? \"       Unknown chronic issue   7. BACK OVERUSE:  Frieda Mae recent lifting of heavy objects, strenuous work or exercise? \"      no  8. MEDICATIONS: \"What have you taken so far for the pain? \" (e.g., nothing, acetaminophen, NSAIDS)     Meloxicam  9. NEUROLOGIC SYMPTOMS: \"Do you have any weakness, numbness, or problems with bowel/bladder control? \"      Numbness in left foot - frequent urination on and off - worse the last week   10. OTHER SYMPTOMS: \"Do you have any other symptoms? \" (e.g., fever, abdominal pain, burning with urination, blood in urine)       Increased urinary frequency   11. PREGNANCY: \"Is there any chance you are pregnant? \" (e.g., yes, no; LMP)        no    Protocols used: BACK PAIN-ADULT-[place]    Patient called pre-service center [facility]) [address] with red flag complaint. Brief description of triage: pt reports increased pain in back, shoulders, and left foot. Numbness and  Tingling in left foot is new / concerning to patient.  Pt also notes having increased urinary urgency    Triage indicates for patient to see PCP today     Care advice provided, patient verbalizes understanding; denies any

## 2020-11-09 ENCOUNTER — HOSPITAL ENCOUNTER (OUTPATIENT)
Dept: PHYSICAL THERAPY | Age: 45
Setting detail: THERAPIES SERIES
Discharge: HOME | End: 2020-11-09
Payer: COMMERCIAL

## 2020-11-09 PROCEDURE — 97140 MANUAL THERAPY 1/> REGIONS: CPT

## 2020-11-09 PROCEDURE — 97110 THERAPEUTIC EXERCISES: CPT

## 2020-11-09 NOTE — FLOWSHEET NOTE
[employer]    Physical Therapy Treatment Note/ Progress Report:     Date:  2020    Patient Name:  Kayy Carvajal    :  1975  MRN: [id number]  Medical/Treatment Diagnosis Information:  Diagnosis: T31.362O (R labral tear)  Treatment Diagnosis: R shoulder pain  Insurance/Certification information:  PT Insurance Information: UMR[place]  Physician Information:  Referring Practitioner: Dr. Keanu Gee of care signed (Y/N):     Date of Patient follow up with Physician:      Progress Report: []  Yes  [x]  No     Functional Scale: SPADI 71%     Date: 20    Date Range for reporting period:  Beginning:    Ending:      Progress report due (10 Rx/or 30 days whichever is less):     Recertification due (POC duration/ or 90 days whichever is less):  20    Visit # Insurance Allowable Auth Needed   2 [place] []Yes    [x]No     Pain level:  8/10     SUBJECTIVE: Pt went to family doctor and received prescription muscle relaxers and steroid z-pack to help with pain and inflammation in R shoulder joint. States HEP going well and is not increasing symptoms.      OBJECTIVE: See eval   Observation:    Test measurements:      RESTRICTIONS/PRECAUTIONS: known posterior labral tear- conservative treatment     Exercises/Interventions:   Therapeutic Ex Wt / Resistance Sets/sec Reps Notes   Table slides  10\" 10 Arms on table, move stool    Scapular retractions  scap elevation/depression  3# 2 10    Shoulder isometrics  10 10s ER/IR   Bent Over Row  2# 1 10 Hi-Hi/Lo  2/2 LBP                        Manual Intervention 18'       Shld /GH Mobs       Post Cap mobs       Thoracic/Rib manipulation       CT MT/Mobs       STM   5'  Posterior Cuff    PROM MT  10 min   ABD/IR    LA Distraction   30\" 3x     Rhythmic Stabs   15\" 3x     NMR re-education       Supine Serratus Punch  Cane  3\"H  10 Therapeutic Exercise and NMR EXR  [x] (64185) Provided verbal/tactile cueing for activities related to strengthening, flexibility, endurance, ROM  for improvements in scapular, scapulothoracic and UE control with self care, reaching, carrying, lifting, house/yardwork, driving/computer work.    [] (72969) Provided verbal/tactile cueing for activities related to improving balance, coordination, kinesthetic sense, posture, motor skill, proprioception  to assist with  scapular, scapulothoracic and UE control with self care, reaching, carrying, lifting, house/yardwork, driving/computer work. Therapeutic Activities:    [] (31290 or 92217) Provided verbal/tactile cueing for activities related to improving balance, coordination, kinesthetic sense, posture, motor skill, proprioception and motor activation to allow for proper function of scapular, scapulothoracic and UE control with self care, carrying, lifting, driving/computer work.      Home Exercise Program:    [x] (94019) Reviewed/Progressed HEP activities related to strengthening, flexibility, endurance, ROM of scapular, scapulothoracic and UE control with self care, reaching, carrying, lifting, house/yardwork, driving/computer work  [] (76183) Reviewed/Progressed HEP activities related to improving balance, coordination, kinesthetic sense, posture, motor skill, proprioception of scapular, scapulothoracic and UE control with self care, reaching, carrying, lifting, house/yardwork, driving/computer work      Manual Treatments:  PROM / STM / Oscillations-Mobs:  G-I, II, III, IV (PA's, Inf., Post.)  [x] (97184) Provided manual therapy to mobilize soft tissue/joints of cervical/CT, scapular GHJ and UE for the purpose of modulating pain, promoting relaxation,  increasing ROM, reducing/eliminating soft tissue swelling/inflammation/restriction, improving soft tissue extensibility and allowing for proper ROM for normal function with self care, reaching, carrying, lifting, house/yardwork, driving/computer work    Modalities:      Charges:  Timed Code Treatment Minutes: 40   Total Treatment Minutes: 40 min       [] EVAL (LOW) 60865 (typically 20 minutes face-to-face)  [] EVAL (MOD) 26806 (typically 30 minutes face-to-face)  [] EVAL (HIGH) 98279 (typically 45 minutes face-to-face)  [] RE-EVAL     [x] FA(27963) x 2    [] IONTO (37330)  [] NMR (24376) x     [] VASO (44979)  [x] Manual (92857) x 1    [] Other:  [] TA (02912)x     [] Mech Traction (80010)  [] ES(attended) (29131)     [] ES (un) (08681):       GOALS:  Patient stated goal: \"no shoulder pain\"   [] Progressing: [] Met: [x] Not Met: [] Adjusted    Therapist goals for Patient:   Short Term Goals: To be achieved in: 2 weeks  1. Independent in HEP and progression per patient tolerance, in order to prevent re-injury. [] Progressing: [] Met: [x] Not Met: [] Adjusted  2. Patient will have a decrease in pain to facilitate improvement in movement, function, and ADLs as indicated by Functional Deficits. [] Progressing: [] Met: [x] Not Met: [] Adjusted    Long Term Goals: To be achieved in: 8 weeks  1. Disability index score of 15% or less for the DASH to assist with reaching prior level of function. [] Progressing: [] Met: [x] Not Met: [] Adjusted  2. Patient will demonstrate increased AROM to [facility] [place] to allow for proper joint functioning as indicated by patients Functional Deficits. [] Progressing: [] Met: [x] Not Met: [] Adjusted  3. Patient will demonstrate an increase in Strength to [employer] [facility] [employer] [place] to allow for proper functional mobility as indicated by patients Functional Deficits. [] Progressing: [] Met: [x] Not Met: [] Adjusted  4. Patient will return to all functional activities without increased symptoms or restriction. [] Progressing: [] Met: [x] Not Met: [] Adjusted  5. Pt will reach behind back pain free.    [] Progressing: [] Met: [x] Not Met: [] Adjusted     Progression Towards Functional goals:  [] Patient is progressing as expected towards functional goals listed. [] Progression is slowed due to complexities listed. [] Progression has been slowed due to co-morbidities. [x] Plan just implemented, too soon to assess goals progression  [] Other:     ASSESSMENT:  Emphasis of skilled therapy visit to improve pt's understanding of diagnosis and prognosis of injury to improve pt compliance and to decrease inappropriate UE postures. Pt reports relief of symptoms with LA distraction of R UE as well as IR PROM. Pt instructed on appropriate self care techniques that will help decrease symptoms throughout work day and ADLs. Pt tolerated increased exercise Rx well and continues to benefit from skilled therapy to improve periscapular strength, endurance, stability and function to improve quality of life. Treatment/Activity Tolerance:  [x] Patient tolerated treatment well [] Patient limited by fatique  [] Patient limited by pain  [] Patient limited by other medical complications  [] Other:     Overall Progression Towards Functional goals/ Treatment Progress Update:  [] Patient is progressing as expected towards functional goals listed. [] Progression is slowed due to complexities/Impairments listed. [] Progression has been slowed due to co-morbidities. [x] Plan just implemented, too soon to assess goals progression <30days   [] Goals require adjustment due to lack of progress  [] Patient is not progressing as expected and requires additional follow up with physician  [] Other    Prognosis for POC: [x] Good [] Fair  [] Poor    Patient requires continued skilled intervention: [x] Yes  [] No      PLAN: Assess pt tolerance to increased exercise Rx with possible update HEP NPV per tolerance.    [x] Continue per plan of care [] Alter current plan (see comments)  [] Plan of care initiated [] Hold pending MD visit [] Discharge    Electronically signed by: Stormy Fraga, PTA [id number]    Note: If patient does not return for scheduled/recommended follow up visits, this note will serve as a discharge from care along with the most recent update on progress.

## 2020-11-11 ENCOUNTER — HOSPITAL ENCOUNTER (OUTPATIENT)
Dept: PHYSICAL THERAPY | Age: 45
Setting detail: THERAPIES SERIES
Discharge: HOME | End: 2020-11-11
Payer: COMMERCIAL

## 2020-11-11 PROCEDURE — 97112 NEUROMUSCULAR REEDUCATION: CPT

## 2020-11-11 PROCEDURE — 97140 MANUAL THERAPY 1/> REGIONS: CPT

## 2020-11-11 PROCEDURE — 97110 THERAPEUTIC EXERCISES: CPT

## 2020-11-11 NOTE — FLOWSHEET NOTE
[employer]    Physical Therapy Treatment Note/ Progress Report:     Date:  2020    Patient Name:  Lia Light    :  1975  MRN: [id number]  Medical/Treatment Diagnosis Information:  Diagnosis: N21.844H (R labral tear)  Treatment Diagnosis: R shoulder pain  Insurance/Certification information:  PT Insurance Information: UMR[place]  Physician Information:  Referring Practitioner: Dr. Deidre Rosario of care signed (Y/N):     Date of Patient follow up with Physician:      Progress Report: []  Yes  [x]  No     Functional Scale: SPADI 71%     Date: 20    Date Range for reporting period:  Beginning:    Ending:      Progress report due (10 Rx/or 30 days whichever is less):     Recertification due (POC duration/ or 90 days whichever is less):  20    Visit # Insurance Allowable Auth Needed   3 [place] []Yes    [x]No     Pain level:  8/10     SUBJECTIVE: Pt went to family doctor and received prescription muscle relaxers and steroid z-pack to help with pain and inflammation in R shoulder joint. Pt reports NT in hand while working on the computer, resolved after a few minutes of position change.      OBJECTIVE: See eval   Observation:    Test measurements:      RESTRICTIONS/PRECAUTIONS: known posterior labral tear- conservative treatment     Exercises/Interventions:   Therapeutic Ex Wt / Resistance Sets/sec Reps Notes   Wall slides  10\" 10 Arms on table, move stool    Tennis ball self mobs  3 10 W/ movement ER    Scapular retractions  scap elevation/depression  3# 2 10    Shoulder isometrics  10 10s ER/IR   Bent Over Row  3# 1 10 Hi-Hi/Lo  2/2 LBP   No money  NPV      Band ER/IR R 2 10           Manual Intervention 18'       Shld /GH Mobs       Post Cap mobs       Thoracic/Rib manipulation       CT MT/Mobs       STM   5'  Posterior Cuff    PROM MT  10 min   ABD/IR    LA Distraction   30\" 3x     Rhythmic Stabs   15\" 3x     NMR re-education       Supine Serratus Punch  Cane  3\"H  10    BB  15s x4 ER/IR                                                 Therapeutic Exercise and NMR EXR  [x] (66219) Provided verbal/tactile cueing for activities related to strengthening, flexibility, endurance, ROM  for improvements in scapular, scapulothoracic and UE control with self care, reaching, carrying, lifting, house/yardwork, driving/computer work.    [] (69985) Provided verbal/tactile cueing for activities related to improving balance, coordination, kinesthetic sense, posture, motor skill, proprioception  to assist with  scapular, scapulothoracic and UE control with self care, reaching, carrying, lifting, house/yardwork, driving/computer work. Therapeutic Activities:    [] (77157 or 26627) Provided verbal/tactile cueing for activities related to improving balance, coordination, kinesthetic sense, posture, motor skill, proprioception and motor activation to allow for proper function of scapular, scapulothoracic and UE control with self care, carrying, lifting, driving/computer work.      Home Exercise Program:    [x] (30465) Reviewed/Progressed HEP activities related to strengthening, flexibility, endurance, ROM of scapular, scapulothoracic and UE control with self care, reaching, carrying, lifting, house/yardwork, driving/computer work  [] (46618) Reviewed/Progressed HEP activities related to improving balance, coordination, kinesthetic sense, posture, motor skill, proprioception of scapular, scapulothoracic and UE control with self care, reaching, carrying, lifting, house/yardwork, driving/computer work      Manual Treatments:  PROM / STM / Oscillations-Mobs:  G-I, II, III, IV (PA's, Inf., Post.)  [x] (12662) Provided manual therapy to mobilize soft tissue/joints of cervical/CT, scapular GHJ and UE for the purpose of modulating pain, promoting relaxation,  increasing ROM, reducing/eliminating soft tissue swelling/inflammation/restriction, improving soft tissue extensibility and allowing for proper ROM for normal function with self care, reaching, carrying, lifting, house/yardwork, driving/computer work    Modalities:      Charges:  Timed Code Treatment Minutes: 40   Total Treatment Minutes: 40 min       [] EVAL (LOW) 20487 (typically 20 minutes face-to-face)  [] EVAL (MOD) 85165 (typically 30 minutes face-to-face)  [] EVAL (HIGH) 25713 (typically 45 minutes face-to-face)  [] RE-EVAL     [x] PK(29598) x 2    [] IONTO (02565)  [] NMR (92762) x     [] VASO (37352)  [x] Manual (08786) x 1    [] Other:  [] TA (30728)x     [] Mech Traction (76402)  [] ES(attended) (53641)     [] ES (un) (59543):       GOALS:  Patient stated goal: \"no shoulder pain\"   [] Progressing: [] Met: [x] Not Met: [] Adjusted    Therapist goals for Patient:   Short Term Goals: To be achieved in: 2 weeks  1. Independent in HEP and progression per patient tolerance, in order to prevent re-injury. [] Progressing: [] Met: [x] Not Met: [] Adjusted  2. Patient will have a decrease in pain to facilitate improvement in movement, function, and ADLs as indicated by Functional Deficits. [] Progressing: [] Met: [x] Not Met: [] Adjusted    Long Term Goals: To be achieved in: 8 weeks  1. Disability index score of 15% or less for the DASH to assist with reaching prior level of function. [] Progressing: [] Met: [x] Not Met: [] Adjusted  2. Patient will demonstrate increased AROM to [facility] [place] to allow for proper joint functioning as indicated by patients Functional Deficits. [] Progressing: [] Met: [x] Not Met: [] Adjusted  3. Patient will demonstrate an increase in Strength to [facility] [place] to allow for proper functional mobility as indicated by patients Functional Deficits. [] Progressing: [] Met: [x] Not Met: [] Adjusted  4. Patient will return to all functional activities without increased symptoms or restriction. [] Progressing: [] Met: [x] Not Met: [] Adjusted  5. Pt will reach behind back pain free.    [] initiated [] Hold pending MD visit [] Discharge    Electronically signed by: Sacha Camarillo, PT    Note: If patient does not return for scheduled/recommended follow up visits, this note will serve as a discharge from care along with the most recent update on progress.

## 2020-11-16 ENCOUNTER — HOSPITAL ENCOUNTER (OUTPATIENT)
Dept: PHYSICAL THERAPY | Age: 45
Setting detail: THERAPIES SERIES
Discharge: HOME | End: 2020-11-16
Payer: COMMERCIAL

## 2020-11-16 ENCOUNTER — OFFICE VISIT (OUTPATIENT)
Dept: ORTHOPEDIC SURGERY | Age: 45
End: 2020-11-16
Payer: COMMERCIAL

## 2020-11-16 VITALS — HEIGHT: 67 IN | TEMPERATURE: 98 F | RESPIRATION RATE: 12 BRPM | BODY MASS INDEX: 29.03 KG/M2 | WEIGHT: 185 LBS

## 2020-11-16 PROCEDURE — 99213 OFFICE O/P EST LOW 20 MIN: CPT | Performed by: PHYSICIAN ASSISTANT

## 2020-11-16 PROCEDURE — 97140 MANUAL THERAPY 1/> REGIONS: CPT

## 2020-11-16 PROCEDURE — 97110 THERAPEUTIC EXERCISES: CPT

## 2020-11-16 NOTE — FLOWSHEET NOTE
[employer]    Physical Therapy Treatment Note/ Progress Report:     Date:  2020    Patient Name:  Hussein He    :  1975  MRN: [id number]  Medical/Treatment Diagnosis Information:  Diagnosis: S43.431D (R labral tear)  Treatment Diagnosis: R shoulder pain  Insurance/Certification information:  PT Insurance Information: UMR[place]  Physician Information:  Referring Practitioner: Dr. Garrido Foil of care signed (Y/N):     Date of Patient follow up with Physician:      Progress Report: []  Yes  [x]  No     Functional Scale: SPADI 71%     Date: 20    Date Range for reporting period:  Beginning:    Ending:      Progress report due (10 Rx/or 30 days whichever is less): 48    Recertification due (POC duration/ or 90 days whichever is less):  20    Visit # Insurance Allowable Auth Needed   4 [place] []Yes    [x]No     Pain level:  810     SUBJECTIVE: Pt states he hasn't been taking his Rx medication for about the last 3 days to assess UE tolerance to skilled therapy. Pt reports he has been performing pendulums a couple times per day to elicit relief of R shoulder pain temporarily.    OBJECTIVE: See eval   Observation:    Test measurements:      RESTRICTIONS/PRECAUTIONS: known posterior labral tear- conservative treatment     Exercises/Interventions:   Therapeutic Ex Wt / Resistance Sets/sec Reps Notes   Wall slides  10\" 10 AAROM    Tennis ball self mobs  3 10 W/ movement ER    Scapular retractions  scap elevation/depression  3# 2 10    Shoulder isometrics  10 10s ER/IR   Bent Over Row  3# 2 10 Hi-Hi/Lo  2/2 LBP   Bent Over Horiz ABD  0# 1 10    No money  Red 2 10    Band ER/IR R 2 10           Manual Intervention 18'       Shld /GH Mobs       Post Cap mobs       Thoracic/Rib manipulation       CT MT/Mobs       STM   5'  Posterior Cuff    PROM MT  10 min   ABD/IR    LA Distraction   30\" 3x     Rhythmic Stabs   15\" 3x NMR re-education       Supine Serratus Punch  Cane+2#  3\"H  12    BB  15s x4 ER/IR                                                 Therapeutic Exercise and NMR EXR  [x] (80140) Provided verbal/tactile cueing for activities related to strengthening, flexibility, endurance, ROM  for improvements in scapular, scapulothoracic and UE control with self care, reaching, carrying, lifting, house/yardwork, driving/computer work.    [] (10624) Provided verbal/tactile cueing for activities related to improving balance, coordination, kinesthetic sense, posture, motor skill, proprioception  to assist with  scapular, scapulothoracic and UE control with self care, reaching, carrying, lifting, house/yardwork, driving/computer work. Therapeutic Activities:    [] (75443 or 38592) Provided verbal/tactile cueing for activities related to improving balance, coordination, kinesthetic sense, posture, motor skill, proprioception and motor activation to allow for proper function of scapular, scapulothoracic and UE control with self care, carrying, lifting, driving/computer work.      Home Exercise Program:    [x] (45085) Reviewed/Progressed HEP activities related to strengthening, flexibility, endurance, ROM of scapular, scapulothoracic and UE control with self care, reaching, carrying, lifting, house/yardwork, driving/computer work  [] (74118) Reviewed/Progressed HEP activities related to improving balance, coordination, kinesthetic sense, posture, motor skill, proprioception of scapular, scapulothoracic and UE control with self care, reaching, carrying, lifting, house/yardwork, driving/computer work      Manual Treatments:  PROM / STM / Oscillations-Mobs:  G-I, II, III, IV (PA's, Inf., Post.)  [x] (56638) Provided manual therapy to mobilize soft tissue/joints of cervical/CT, scapular GHJ and UE for the purpose of modulating pain, promoting relaxation,  increasing ROM, reducing/eliminating soft tissue swelling/inflammation/restriction, improving soft tissue extensibility and allowing for proper ROM for normal function with self care, reaching, carrying, lifting, house/yardwork, driving/computer work    Modalities:      Charges:  Timed Code Treatment Minutes: 40   Total Treatment Minutes: 40 min       [] EVAL (LOW) 32920 (typically 20 minutes face-to-face)  [] EVAL (MOD) 04302 (typically 30 minutes face-to-face)  [] EVAL (HIGH) 86466 (typically 45 minutes face-to-face)  [] RE-EVAL     [x] VF(39847) x 2    [] IONTO (93664)  [] NMR (31782) x     [] VASO (17433)  [x] Manual (09612) x 1    [] Other:  [] TA (28208)x     [] Mech Traction (39619)  [] ES(attended) (94752)     [] ES (un) (20290):       GOALS:  Patient stated goal: \"no shoulder pain\"   [] Progressing: [] Met: [x] Not Met: [] Adjusted    Therapist goals for Patient:   Short Term Goals: To be achieved in: 2 weeks  1. Independent in HEP and progression per patient tolerance, in order to prevent re-injury. [] Progressing: [] Met: [x] Not Met: [] Adjusted  2. Patient will have a decrease in pain to facilitate improvement in movement, function, and ADLs as indicated by Functional Deficits. [] Progressing: [] Met: [x] Not Met: [] Adjusted    Long Term Goals: To be achieved in: 8 weeks  1. Disability index score of 15% or less for the DASH to assist with reaching prior level of function. [] Progressing: [] Met: [x] Not Met: [] Adjusted  2. Patient will demonstrate increased AROM to [facility] [place] to allow for proper joint functioning as indicated by patients Functional Deficits. [] Progressing: [] Met: [x] Not Met: [] Adjusted  3. Patient will demonstrate an increase in Strength to [facility] [place] to allow for proper functional mobility as indicated by patients Functional Deficits. [] Progressing: [] Met: [x] Not Met: [] Adjusted  4. Patient will return to all functional activities without increased symptoms or restriction. [] Progressing: [] Met: [x] Not Met: [] Adjusted  5.  Pt will reach behind back pain free.   [] Progressing: [] Met: [x] Not Met: [] Adjusted     Progression Towards Functional goals:  [] Patient is progressing as expected towards functional goals listed. [] Progression is slowed due to complexities listed. [] Progression has been slowed due to co-morbidities. [x] Plan just implemented, too soon to assess goals progression  [] Other:     ASSESSMENT: Pt had mild c/o R hand NT at end range PROM IR, however, symptoms were relieved at cessation of stretch. Pt tolerated increased exercise Rx well, with VC's to improve biomechanics and scapular stability during TBand ER as pt defaults into scapular protraction. Pt continues to benefit from skilled therapy to improve periscapular strength, endurance, stability and function to improve quality of life. Updated HEP to include bilat ER and bent over horizontal ABD. Treatment/Activity Tolerance:  [x] Patient tolerated treatment well [] Patient limited by fatique  [] Patient limited by pain  [] Patient limited by other medical complications  [] Other:     Overall Progression Towards Functional goals/ Treatment Progress Update:  [] Patient is progressing as expected towards functional goals listed. [] Progression is slowed due to complexities/Impairments listed. [] Progression has been slowed due to co-morbidities.   [x] Plan just implemented, too soon to assess goals progression <30days   [] Goals require adjustment due to lack of progress  [] Patient is not progressing as expected and requires additional follow up with physician  [] Other    Prognosis for POC: [x] Good [] Fair  [] Poor    Patient requires continued skilled intervention: [x] Yes  [] No      PLAN: Assess pt tolerance to increased exercise Rx   [x] Continue per plan of care [] Alter current plan (see comments)  [] Plan of care initiated [] Hold pending MD visit [] Discharge    Electronically signed by: Benny Wells, PTA [id number]    Note: If patient does not return for

## 2020-11-16 NOTE — PROGRESS NOTES
Follow up: Sherri Arevalo  1975  [id number]         Chief Complaint   Patient presents with    Neck Pain     F/u C5-C6 [place] 10/19  (#1)         HISTORY OF PRESENT ILLNESS:  Mr. Jovon Myers is a 54 y.o. male returns for a follow up visit for multiple medical problems. His current presenting problems are   1. Cervical radiculopathy    2. HNP (herniated nucleus pulposus), cervical    3. Acute pain of right shoulder    . As per information/history obtained from the PADT(patient assessment and documentation tool) - He complains of pain in the neck with radiation to the shoulders Right He rates the pain 6/10 and describes it as dull, aching, burning, stabbing. Pain is made worse by: computer work. He denies side effects from the current pain regimen. Patient reports that since the last follow up visit the physical functioning is unchanged, family/social relationships are unchanged, mood is unchanged and sleep patterns are unchanged, and that the overall functioning is unchanged. Patient denies neurological bowel or bladder. The patient presents today in follow-up after a C5-6 interlaminar epidural steroid injection. The patient describes that he is relatively unchanged following the epidural.  The patient has been diagnosed recently with a right labral tear and is currently doing physical therapy for the shoulder which she describes as not considerably helping. The patient describes that lying down does improve his pain. He has continued to see the chiropractor which is not helping for the neck however the right shoulder is a considerable concern for him and he is determining whether he would like to proceed with surgery or not. Associated signs and symptoms:   Neurogenic bowel or bladder symptoms:  no   Perceived weakness:  yes   Difficulty walking:  no            Past medical, surgical, social and family history reviewed with the patient.      Past Medical History:   Past Medical History: Diagnosis Date    Chronic back pain     Frequent urination     GERD (gastroesophageal reflux disease)     Sleep apnea     with c pap      Past Surgical History:     Past Surgical History:   Procedure Laterality Date    CERVICAL SPINE SURGERY N/A 8/21/2019    C6C7 INTERLAMINAR EPIDURAL STEROID INJECTION WITH FLUOROSCOPY performed by Renée Martin MD at [facility] N/A 10/19/2020    C5C6 INTERLAMINAR EPIDURAL STEROID INJECTION WITH FLUOROSCOPY performed by Renée Martin MD at [address] [place] [address]    TONSILLECTOMY       Current Medications:     Current Outpatient Medications:     tiZANidine (ZANAFLEX) 2 MG tablet, Take 1-2 tablets by mouth every 8 hours as needed (muscle spasm), Disp: 30 tablet, Rfl: 0    pantoprazole (PROTONIX) 20 MG tablet, Take 20 mg by mouth daily, Disp: , Rfl:     colchicine (COLCRYS) 0.6 MG tablet, TAKE 1 TABLET BY MOUTH TWICE DAILY AS NEEDED FOR GOUT, Disp: 60 tablet, Rfl: 0    UNKNOWN TO PATIENT, Indications: CBD OIL , Disp: , Rfl:   Allergies:  Patient has no known allergies. Social History:    reports that he has never smoked. He has never used smokeless tobacco. He reports current alcohol use of about 1.0 standard drinks of alcohol per week. He reports that he does not use drugs. Family History:   Family History   Problem Relation Age of Onset    Diabetes Mother     Cancer Father 66        esophageal    Diabetes Father     Cancer Maternal Grandmother     Cancer Maternal Grandfather        REVIEW OF SYSTEMS:   CONSTITUTIONAL: Denies unexplained weight loss, fevers, chills or fatigue  NEUROLOGICAL: Denies unsteady gait or progressive weakness  MUSCULOSKELETAL: Denies joint swelling or redness  GI: Denies nausea, vomiting, diarrhea   : Denies bowel or bladder issues       PHYSICAL EXAM:    Vitals: Temperature 98 °F (36.7 °C), resp. rate 12, height 5' 7\" (1.702 m), weight 185 lb (83.9 kg).     GENERAL EXAM:  · General Apparence: Patient is adequately groomed with no evidence of malnutrition. · Psychiatric: Orientation: The patient is oriented to time, place and person. The patient's mood and affect are appropriate   · Vascular: Examination reveals no swelling and palpation reveals no tenderness in upper or lower extremities. Good capillary refill. · Sensation is intact without deficit in the upper and lower extremities to light touch. · Coordination of the upper and lower extremities are normal.    CERVICAL EXAMINATION:  · Inspection: Local inspection shows no step-off or bruising. Cervical alignment is normal. No instability is noted. · Palpation and Percussion: No evidence of tenderness at the midline. Paraspinal tenderness is present. There is no paraspinal spasm. Skin: There are no rashes, ulcerations or lesions  · Range of Motion:  limited by 50% in all planes due to pain   · Strength: 5/5 bilateral upper extremities  · Special Tests:   Spurling's and Moody's are negative bilaterally. Brown and Impingement tests are negative bilaterally. · Skin:There are no rashes, ulcerations or lesions in right & left upper extremities. · Reflexes: Bilaterally triceps, biceps and brachioradialis are 1+. Clonus absent bilaterally at the feet. No pathological reflexes are noted. · Gait & station: normal, patient ambulates without assistance and no ataxia  · Additional Examinations:  · RIGHT UPPER EXTREMITY:  Inspection/examination of the right upper extremity does right shoulder tenderness, with no deformity with labral injury. Range of motion is limited with shoulder ROM. There is no gross instability. There are no rashes, ulcerations or lesions. Strength and tone are normal. No atrophy or abnormal movements are noted. · LEFT UPPER EXTREMITY: Inspection/examination of the left upper extremity does not show any tenderness, deformity or injury. Range of motion is unremarkable and pain-free. There is no gross instability. There are no rashes, ulcerations or lesions. Strength and tone are normal. No atrophy or abnormal movements are noted. · RIGHT LOWER EXTREMITY: Inspection/examination of the right lower extremity does not show any tenderness, deformity or injury. Range of motion is normal and pain-free. There is no gross instability. There are no rashes, ulcerations or lesions. Strength and tone are normal. No atrophy or abnormal movements are noted. · LEFT LOWER EXTREMITY:  Inspection/examination of the left lower extremity does not show any tenderness, deformity or injury. Range of motion is normal and pain-free. There is no gross instability. There are no rashes, ulcerations or lesions. Strength and tone are normal. No atrophy or abnormal movements are noted. Diagnostic Testing:    MR cervical spine shows from 03/12/2019   CONCLUSION:  A broad spondylotic C5-6 disc displacement with a superimposed small central disc protrusion encroaches  upon the ventral cord and the exiting C6 nerve root sheaths. Mild to moderate bilateral foraminal stenosis  secondary to Luschka joint hypertrophy and facet hypertrophy with C6 root effacement. Moderate left C3-4 foraminal stenosis secondary to facet and Luschka joint hypertrophy with C4 root  effacement. Straightening and reversal of the cervical lordosis, retrolisthetic microinstability at C5-6 and multilevel facet  and Luschka joint hypertrophy may all contribute to the patient's history of cervical pain. Results for orders placed or performed in visit on 10/14/20   COVID-19   Result Value Ref Range    SARS-CoV-2, PCR Not Detected Not Detected     Impression:       1. Cervical radiculopathy    2. HNP (herniated nucleus pulposus), cervical    3. Acute pain of right shoulder        Plan:  Clinical Course: shows no change    I discussed the diagnosis and the treatment options with Bienvenido Forbes today.      In Summary:  The various treatment options were outlined and discussed with

## 2020-11-18 ENCOUNTER — HOSPITAL ENCOUNTER (OUTPATIENT)
Dept: PHYSICAL THERAPY | Age: 45
Setting detail: THERAPIES SERIES
Discharge: HOME | End: 2020-11-18
Payer: COMMERCIAL

## 2020-11-18 PROCEDURE — 97112 NEUROMUSCULAR REEDUCATION: CPT

## 2020-11-18 PROCEDURE — 97140 MANUAL THERAPY 1/> REGIONS: CPT

## 2020-11-18 PROCEDURE — 97110 THERAPEUTIC EXERCISES: CPT

## 2020-11-18 NOTE — FLOWSHEET NOTE
[address], [facility]    Physical Therapy Treatment Note/ Progress Report:     Date:  2020    Patient Name:  Nelly Nunez    :  1975  MRN: [id number]  Medical/Treatment Diagnosis Information:  Diagnosis: S43.431D (R labral tear)  Treatment Diagnosis: R shoulder pain  Insurance/Certification information:  PT Insurance Information: UMR/[place]  Physician Information:  Referring Practitioner: Dr. Alia Ariza of care signed (Y/N):     Date of Patient follow up with Physician:      Progress Report: []  Yes  [x]  No     Functional Scale: SPADI 71%     Date: 20    Date Range for reporting period:  Beginning:    Ending:      Progress report due (10 Rx/or 30 days whichever is less): 14    Recertification due (POC duration/ or 90 days whichever is less):  20    Visit # Insurance Allowable Auth Needed   5 [place] []Yes    [x]No     Pain level:  810     SUBJECTIVE: Reports improved ROM yesterday, increased soreness today.     OBJECTIVE: See eval   Observation:    Test measurements:      RESTRICTIONS/PRECAUTIONS: known posterior labral tear- conservative treatment     Exercises/Interventions:   Therapeutic Ex Wt / Resistance Sets/sec Reps Notes   Wall slides  10\" 10 AAROM    Tennis ball self mobs  3 10 W/ movement ER    Scapular retractions  scap elevation/depression  3# 2 10    Supine D2 NPV      SL ER  SL abduction  4#/3# 2 10    Shoulder isometrics  10 10s ER/IR   Bent Over Row  3# 2 10 Hi-Hi/Lo  2/2 LBP   Bent Over Horiz ABD  0# 1 10    No money  Green 2 10    Band ER/IR blue 2 10           Manual Intervention 18'       Shld /GH Mobs       Post Cap mobs       Thoracic/Rib manipulation       CT MT/Mobs       STM   5'  Posterior Cuff    PROM MT  10 min   ABD/IR    LA Distraction   30\" 3x     Rhythmic Stabs  5# KB 15\" 3x     NMR re-education       Supine Serratus Punch  Cane+2#  3\"H  12    BB  30s x4 ER/IR  Flexion NPV Therapeutic Exercise and NMR EXR  [x] (76383) Provided verbal/tactile cueing for activities related to strengthening, flexibility, endurance, ROM  for improvements in scapular, scapulothoracic and UE control with self care, reaching, carrying, lifting, house/yardwork, driving/computer work.    [] (05767) Provided verbal/tactile cueing for activities related to improving balance, coordination, kinesthetic sense, posture, motor skill, proprioception  to assist with  scapular, scapulothoracic and UE control with self care, reaching, carrying, lifting, house/yardwork, driving/computer work. Therapeutic Activities:    [] (65580 or 48312) Provided verbal/tactile cueing for activities related to improving balance, coordination, kinesthetic sense, posture, motor skill, proprioception and motor activation to allow for proper function of scapular, scapulothoracic and UE control with self care, carrying, lifting, driving/computer work.      Home Exercise Program:    [x] (98918) Reviewed/Progressed HEP activities related to strengthening, flexibility, endurance, ROM of scapular, scapulothoracic and UE control with self care, reaching, carrying, lifting, house/yardwork, driving/computer work  [] (12835) Reviewed/Progressed HEP activities related to improving balance, coordination, kinesthetic sense, posture, motor skill, proprioception of scapular, scapulothoracic and UE control with self care, reaching, carrying, lifting, house/yardwork, driving/computer work      Manual Treatments:  PROM / STM / Oscillations-Mobs:  G-I, II, III, IV (PA's, Inf., Post.)  [x] (53760) Provided manual therapy to mobilize soft tissue/joints of cervical/CT, scapular GHJ and UE for the purpose of modulating pain, promoting relaxation,  increasing ROM, reducing/eliminating soft tissue swelling/inflammation/restriction, improving soft tissue extensibility and allowing for proper ROM for normal function with self care, reaching, carrying, lifting, house/yardwork, driving/computer work    Modalities:      Charges:  Timed Code Treatment Minutes: 40   Total Treatment Minutes: 40 min       [] EVAL (LOW) 52456 (typically 20 minutes face-to-face)  [] EVAL (MOD) 06368 (typically 30 minutes face-to-face)  [] EVAL (HIGH) 12901 (typically 45 minutes face-to-face)  [] RE-EVAL     [x] BJ(24737) x 2    [] IONTO (02221)  [] NMR (42884) x     [] VASO (00456)  [x] Manual ([id number]) x 1    [] Other:  [] TA (88210)x     [] Mech Traction (55620)  [] ES(attended) (37230)     [] ES (un) (34250):       GOALS:  Patient stated goal: \"no shoulder pain\"   [] Progressing: [] Met: [x] Not Met: [] Adjusted    Therapist goals for Patient:   Short Term Goals: To be achieved in: 2 weeks  1. Independent in HEP and progression per patient tolerance, in order to prevent re-injury. [] Progressing: [] Met: [x] Not Met: [] Adjusted  2. Patient will have a decrease in pain to facilitate improvement in movement, function, and ADLs as indicated by Functional Deficits. [] Progressing: [] Met: [x] Not Met: [] Adjusted    Long Term Goals: To be achieved in: 8 weeks  1. Disability index score of 15% or less for the DASH to assist with reaching prior level of function. [] Progressing: [] Met: [x] Not Met: [] Adjusted  2. Patient will demonstrate increased AROM to [facility] [place] to allow for proper joint functioning as indicated by patients Functional Deficits. [] Progressing: [] Met: [x] Not Met: [] Adjusted  3. Patient will demonstrate an increase in Strength to [facility] to allow for proper functional mobility as indicated by patients Functional Deficits. [] Progressing: [] Met: [x] Not Met: [] Adjusted  4. Patient will return to all functional activities without increased symptoms or restriction. [] Progressing: [] Met: [x] Not Met: [] Adjusted  5. Pt will reach behind back pain free.    [] Progressing: [] Met: [x] Not Met: [] Adjusted     Progression Towards Functional goals:  [] Patient is progressing as expected towards functional goals listed. [] Progression is slowed due to complexities listed. [] Progression has been slowed due to co-morbidities. [x] Plan just implemented, too soon to assess goals progression  [] Other:     ASSESSMENT: Improved pain free PROM. Pt continues to benefit from skilled therapy to improve periscapular strength, endurance, stability and function to improve quality of life. Updated HEP to include bilat ER and bent over horizontal ABD. Treatment/Activity Tolerance:  [x] Patient tolerated treatment well [] Patient limited by fatique  [] Patient limited by pain  [] Patient limited by other medical complications  [] Other:     Overall Progression Towards Functional goals/ Treatment Progress Update:  [] Patient is progressing as expected towards functional goals listed. [] Progression is slowed due to complexities/Impairments listed. [] Progression has been slowed due to co-morbidities. [x] Plan just implemented, too soon to assess goals progression <30days   [] Goals require adjustment due to lack of progress  [] Patient is not progressing as expected and requires additional follow up with physician  [] Other    Prognosis for POC: [x] Good [] Fair  [] Poor    Patient requires continued skilled intervention: [x] Yes  [] No      PLAN: Assess pt tolerance to increased exercise Rx   [x] Continue per plan of care [] Alter current plan (see comments)  [] Plan of care initiated [] Hold pending MD visit [] Discharge    Electronically signed by: Sacha Camarillo, PT    Note: If patient does not return for scheduled/recommended follow up visits, this note will serve as a discharge from care along with the most recent update on progress.

## 2020-11-23 ENCOUNTER — HOSPITAL ENCOUNTER (OUTPATIENT)
Dept: PHYSICAL THERAPY | Age: 45
Setting detail: THERAPIES SERIES
Discharge: HOME | End: 2020-11-23
Payer: COMMERCIAL

## 2020-11-23 PROCEDURE — 97140 MANUAL THERAPY 1/> REGIONS: CPT | Performed by: SPECIALIST/TECHNOLOGIST

## 2020-11-23 PROCEDURE — 97110 THERAPEUTIC EXERCISES: CPT | Performed by: SPECIALIST/TECHNOLOGIST

## 2020-11-23 NOTE — FLOWSHEET NOTE
[employer]    Physical Therapy Treatment Note/ Progress Report:     Date:  2020    Patient Name:  Apple Milian    :  1975  MRN: [id number]  Medical/Treatment Diagnosis Information:  Diagnosis: U70.869L (R labral tear)  Treatment Diagnosis: R shoulder pain  Insurance/Certification information:  PT Insurance Information: UMR/[place]OP [place]  Physician Information:  Referring Practitioner: Dr. Noe Grimaldo of care signed (Y/N):     Date of Patient follow up with Physician: Moni Dee   Progress Report: []  Yes  [x]  No     Functional Scale: SPADI 71%     Date: 20    Date Range for reporting period:  Beginning:    Ending:      Progress report due (10 Rx/or 30 days whichever is less):     Recertification due (POC duration/ or 90 days whichever is less):  20    Visit # Insurance Allowable Auth Needed    6    [place] []Yes    [x]No     Pain level:  8/10     SUBJECTIVE: Rt shoulder is stiff but pain is better.  Reaching behind his back and up [place] in 90     OBJECTIVE:    Observation:    Test measurements:      RESTRICTIONS/PRECAUTIONS: known posterior labral tear- conservative treatment   Exercises/Interventions:   Therapeutic Ex Wt / Resistance Sets/sec Reps Notes   Scapular retractions  scap elevation/depression  3# 2 10    Supine D2 NPV      SL ER  SL abduction   Straight elbow          2x 10   4# 2 10    Bent Over Row   extension 4#   3# 2 10 Hi-Hi/Lo  2/2 LBP   Bent Over Horiz ABD  0# 1 10    No money  Red 2 10    Band ER/IR   rows/ extensions blue 2 10     IR towel   15\" 5x    Manual Intervention 10'       Shld /GH Mobs       Post Cap mobs       Thoracic/Rib manipulation       CT MT/Mobs       PROM MT  10 min   ABD/IR       Rhythmic Stabs   15\" 3x     NMR re-education          BB Yellow 30s x4 ER/IR  Flexion NPV                                                 Therapeutic Exercise and NMR EXR  [x] (86041) Provided Charges:  Timed Code Treatment Minutes: 40   Total Treatment Minutes: 40 min       [] EVAL (LOW) 26513 (typically 20 minutes face-to-face)  [] EVAL (MOD) 32358 (typically 30 minutes face-to-face)  [] EVAL (HIGH) 16695 (typically 45 minutes face-to-face)  [] RE-EVAL     [x] FQ(90570) x 2    [] IONTO (72704)  [] NMR (86155) x     [] VASO (33074)  [x] Manual (78370) x 1    [] Other:  [] TA (25877)x     [] Mech Traction (10748)  [] ES(attended) (64499)     [] ES (un) (61542):       GOALS:  Patient stated goal: \"no shoulder pain\"   [] Progressing: [] Met: [x] Not Met: [] Adjusted    Therapist goals for Patient:   Short Term Goals: To be achieved in: 2 weeks  1. Independent in HEP and progression per patient tolerance, in order to prevent re-injury. [] Progressing: [] Met: [x] Not Met: [] Adjusted  2. Patient will have a decrease in pain to facilitate improvement in movement, function, and ADLs as indicated by Functional Deficits. [] Progressing: [] Met: [x] Not Met: [] Adjusted    Long Term Goals: To be achieved in: 8 weeks  1. Disability index score of 15% or less for the DASH to assist with reaching prior level of function. [] Progressing: [] Met: [x] Not Met: [] Adjusted  2. Patient will demonstrate increased AROM to [facility] [place] to allow for proper joint functioning as indicated by patients Functional Deficits. [] Progressing: [] Met: [x] Not Met: [] Adjusted  3. Patient will demonstrate an increase in Strength to [facility] [place] to allow for proper functional mobility as indicated by patients Functional Deficits. [] Progressing: [] Met: [x] Not Met: [] Adjusted  4. Patient will return to all functional activities without increased symptoms or restriction. [] Progressing: [] Met: [x] Not Met: [] Adjusted  5. Pt will reach behind back pain free. [] Progressing: [] Met: [x] Not Met: [] Adjusted     Progression Towards Functional goals:  [] Patient is progressing as expected towards functional goals listed.     [] Progression is slowed due to complexities listed. [] Progression has been slowed due to co-morbidities. [x] Plan just implemented, too soon to assess goals progression  [] Other:     ASSESSMENT:   Improved pain free PROM which is full in all planes. Patient reported increased numbness/ tingling into his Rt hand throughout program especially with most table exercises. Pt continues to benefit from skilled therapy to improve periscapular strength, endurance, stability and function to improve quality of life. Discussed importance of not compensating with his upper traps and neck throughout his program today. Treatment/Activity Tolerance:  [x] Patient tolerated treatment well [x] Patient limited by fatique  [] Patient limited by pain  [] Patient limited by other medical complications  [] Other:     Overall Progression Towards Functional goals/ Treatment Progress Update:  [] Patient is progressing as expected towards functional goals listed. [] Progression is slowed due to complexities/Impairments listed. [] Progression has been slowed due to co-morbidities. [x] Plan just implemented, too soon to assess goals progression <30days   [] Goals require adjustment due to lack of progress  [] Patient is not progressing as expected and requires additional follow up with physician  [] Other    Prognosis for POC: [x] Good [] Fair  [] Poor    Patient requires continued skilled intervention: [x] Yes  [] No      PLAN: Assess pt tolerance to increased exercise Rx . 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

## 2020-11-24 RX ORDER — COLCHICINE 0.6 MG/1
TABLET ORAL
Qty: 60 TABLET | Refills: 0 | Status: SHIPPED | OUTPATIENT
Start: 2020-11-24 | End: 2021-01-25

## 2020-11-30 ENCOUNTER — HOSPITAL ENCOUNTER (OUTPATIENT)
Dept: PHYSICAL THERAPY | Age: 45
Setting detail: THERAPIES SERIES
Discharge: HOME | End: 2020-11-30
Payer: COMMERCIAL

## 2020-11-30 PROCEDURE — 97110 THERAPEUTIC EXERCISES: CPT

## 2020-11-30 PROCEDURE — 97140 MANUAL THERAPY 1/> REGIONS: CPT

## 2020-11-30 NOTE — FLOWSHEET NOTE
[employer]    Physical Therapy Treatment Note/ Progress Report:     Date:  2020    Patient Name:  Martinez Pelayo    :  1975  MRN: [id number]  Medical/Treatment Diagnosis Information:  Diagnosis: Y97.619M (R labral tear)  Treatment Diagnosis: R shoulder pain  Insurance/Certification information:  PT Insurance Information: UMR/[place]OP [place]  Physician Information:  Referring Practitioner: Dr. Mckeon Trisha of care signed (Y/N):     Date of Patient follow up with Physician: Deneen Hogan   Progress Report: []  Yes  [x]  No     Functional Scale: SPADI 71%     Date: 20    Date Range for reporting period:  Beginning:    Ending:      Progress report due (10 Rx/or 30 days whichever is less):     Recertification due (POC duration/ or 90 days whichever is less):  20    Visit # Insurance Allowable Auth Needed    7    [place] []Yes    [x]No     Pain level:  6-8/10     SUBJECTIVE: Pt reports R shoulder ache and stiffness has decreased as work has been more tolerable over the past week 2/2 improved symptoms. Pt reports no significant change in UE NT symptoms since last visit.      OBJECTIVE:    Observation:    Test measurements:    · 2020: PROM R shoulder: IR: [email or web address]     RESTRICTIONS/PRECAUTIONS: known posterior labral tear- conservative treatment   Exercises/Interventions:   Therapeutic Ex Wt / Resistance Sets/sec Reps Notes   Scapular retractions  scap elevation/depression  3# 2 10    Supine D2 NPV    Attempted but painful    Bent Over Scaption  0# 2 10 30 because D2 flex was painful   SL ER 4# 2 12 Cues for Retraction   SL abduction   Straight elbow 1# 3 10    Bent Over Row   extension 4# 2 12 Hi-Hi/Lo  2/2 LBP   Bent Over Horiz ABD  No money  Red 2 10    Band ER/IR   rows/ extensions blue 2 10     IR towel   15\" 5x    Manual Intervention 10'       Shld /GH Mobs       Post Cap mobs       Thoracic/Rib manipulation CT MT/Mobs       PROM MT  10 min   ABD/IR       Rhythmic Stabs   15\" 3x     NMR re-education          BB Yellow 15s  15s x3  x3 ER/IR  Flexion with good Retraction                                                   Therapeutic Exercise and NMR EXR  [x] (94242) Provided verbal/tactile cueing for activities related to strengthening, flexibility, endurance, ROM  for improvements in scapular, scapulothoracic and UE control with self care, reaching, carrying, lifting, house/yardwork, driving/computer work.    [] (88518) Provided verbal/tactile cueing for activities related to improving balance, coordination, kinesthetic sense, posture, motor skill, proprioception  to assist with  scapular, scapulothoracic and UE control with self care, reaching, carrying, lifting, house/yardwork, driving/computer work. Therapeutic Activities:    [] (85509 or 47381) Provided verbal/tactile cueing for activities related to improving balance, coordination, kinesthetic sense, posture, motor skill, proprioception and motor activation to allow for proper function of scapular, scapulothoracic and UE control with self care, carrying, lifting, driving/computer work.      Home Exercise Program:    [x] (43512) Reviewed/Progressed HEP activities related to strengthening, flexibility, endurance, ROM of scapular, scapulothoracic and UE control with self care, reaching, carrying, lifting, house/yardwork, driving/computer work  [] (05182) Reviewed/Progressed HEP activities related to improving balance, coordination, kinesthetic sense, posture, motor skill, proprioception of scapular, scapulothoracic and UE control with self care, reaching, carrying, lifting, house/yardwork, driving/computer work      Manual Treatments:  PROM / STM / Oscillations-Mobs:  G-I, II, III, IV (PA's, Inf., Post.)  [x] (70447) Provided manual therapy to mobilize soft tissue/joints of cervical/CT, scapular GHJ and UE for the purpose of modulating pain, promoting relaxation,  increasing ROM, reducing/eliminating soft tissue swelling/inflammation/restriction, improving soft tissue extensibility and allowing for proper ROM for normal function with self care, reaching, carrying, lifting, house/yardwork, driving/computer work    Modalities:      Charges:  Timed Code Treatment Minutes: 30   Total Treatment Minutes: 30min       [] EVAL (LOW) 37118 (typically 20 minutes face-to-face)  [] EVAL (MOD) 79327 (typically 30 minutes face-to-face)  [] EVAL (HIGH) 02115 (typically 45 minutes face-to-face)  [] RE-EVAL     [x] JA(48550) x 1    [] IONTO (21545)  [] NMR (38328) x     [] VASO (69904)  [x] Manual (74108) x 1    [] Other:  [] TA (59853)x     [] Mech Traction (40276)  [] ES(attended) (51512)     [] ES (un) (85608):       GOALS:  Patient stated goal: \"no shoulder pain\"   [] Progressing: [] Met: [x] Not Met: [] Adjusted    Therapist goals for Patient:   Short Term Goals: To be achieved in: 2 weeks  1. Independent in HEP and progression per patient tolerance, in order to prevent re-injury. [] Progressing: [] Met: [x] Not Met: [] Adjusted  2. Patient will have a decrease in pain to facilitate improvement in movement, function, and ADLs as indicated by Functional Deficits. [] Progressing: [] Met: [x] Not Met: [] Adjusted    Long Term Goals: To be achieved in: 8 weeks  1. Disability index score of 15% or less for the DASH to assist with reaching prior level of function. [] Progressing: [] Met: [x] Not Met: [] Adjusted  2. Patient will demonstrate increased AROM to [facility] [place] to allow for proper joint functioning as indicated by patients Functional Deficits. [] Progressing: [] Met: [x] Not Met: [] Adjusted  3. Patient will demonstrate an increase in Strength to [facility] [place] to allow for proper functional mobility as indicated by patients Functional Deficits. [] Progressing: [] Met: [x] Not Met: [] Adjusted  4.  Patient will return to all functional activities without increased symptoms or restriction. [] Progressing: [] Met: [x] Not Met: [] Adjusted  5. Pt will reach behind back pain free. [] Progressing: [] Met: [x] Not Met: [] Adjusted     Progression Towards Functional goals:  [] Patient is progressing as expected towards functional goals listed. [] Progression is slowed due to complexities listed. [] Progression has been slowed due to co-morbidities. [x] Plan just implemented, too soon to assess goals progression  [] Other:     ASSESSMENT: Tx was modified this date 2/2 pt time constraints. Attempted supine D2 flex/ext, however, was D/C'd 2/2 impingement pain expressed in ant shoulder in starting position. Pt was able to tolerate bent over scaption raise with min VC's to improve UE biomechanics. Pt required increased tactile cueing to improve upward rotation of R scapula during SL ABD to improve proprioception and function of shoulder complex. Progressing well through POC and benefits from increased challenge with R UE outstretched to decrease functional deficits still present. Treatment/Activity Tolerance:  [x] Patient tolerated treatment well [] Patient limited by fatique  [] Patient limited by pain  [] Patient limited by other medical complications  [] Other:     Overall Progression Towards Functional goals/ Treatment Progress Update:  [] Patient is progressing as expected towards functional goals listed. [] Progression is slowed due to complexities/Impairments listed. [] Progression has been slowed due to co-morbidities. [x] Plan just implemented, too soon to assess goals progression <30days   [] Goals require adjustment due to lack of progress  [] Patient is not progressing as expected and requires additional follow up with physician  [] Other    Prognosis for POC: [x] Good [] Fair  [] Poor    Patient requires continued skilled intervention: [x] Yes  [] No      PLAN: Assess pt tolerance to increased exercise Rx .  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

## 2020-12-01 ENCOUNTER — VIRTUAL VISIT (OUTPATIENT)
Dept: FAMILY MEDICINE CLINIC | Age: 45
End: 2020-12-01
Payer: COMMERCIAL

## 2020-12-01 PROCEDURE — 99213 OFFICE O/P EST LOW 20 MIN: CPT | Performed by: FAMILY MEDICINE

## 2020-12-01 RX ORDER — TAMSULOSIN HYDROCHLORIDE 0.4 MG/1
0.4 CAPSULE ORAL DAILY
Qty: 30 CAPSULE | Refills: 5 | Status: SHIPPED | OUTPATIENT
Start: 2020-12-01 | End: 2021-04-08

## 2020-12-01 RX ORDER — HYDROCODONE BITARTRATE AND ACETAMINOPHEN 5; 325 MG/1; MG/1
1 TABLET ORAL EVERY 6 HOURS PRN
Qty: 12 TABLET | Refills: 0 | Status: SHIPPED | OUTPATIENT
Start: 2020-12-01 | End: 2020-12-04

## 2020-12-01 NOTE — PROGRESS NOTES
2020    TELEHEALTH EVALUATION -- Audio/Visual (During PSMZV-28 public health emergency)    HPI:    Alli Aguila (:  1975) has requested an audio/video evaluation for the following concern(s):    Chief Complaint   Patient presents with    Nephrolithiasis     Patient has history of kidney stones and states he has one now. Complains of ower back pain on his right side, nausea since last Wednesday. Pain started last wed. Started in waves on the right side of back. Got worse and then developed n/v. Went away   Pain recurred this am.  Has not passed any stones. Feels similar to previous stone  Review of Systems  No f/c  Prior to Visit Medications    Medication Sig Taking? Authorizing Provider   colchicine (COLCRYS) 0.6 MG tablet TAKE 1 TABLET BY MOUTH TWICE DAILY AS NEEDED FOR GOUT Yes Sola Willis MD   pantoprazole (PROTONIX) 20 MG tablet Take 20 mg by mouth daily Yes Historical Provider, MD   UNKNOWN TO PATIENT Indications: CBD OIL  Yes Historical Provider, MD   tiZANidine (ZANAFLEX) 2 MG tablet Take 1-2 tablets by mouth every 8 hours as needed (muscle spasm)  Patient not taking: Reported on 2020  ANA CRISTINA Hernandez - CNP       Social History     Tobacco Use    Smoking status: Never Smoker    Smokeless tobacco: Never Used   Substance Use Topics    Alcohol use:  Yes     Alcohol/week: 1.0 standard drinks     Types: 1 Cans of beer per week     Comment: occasionally     Drug use: No        No Known Allergies    PHYSICAL EXAMINATION:  [ INSTRUCTIONS:  \"[x]\" Indicates a positive item  \"[]\" Indicates a negative item  -- DELETE ALL ITEMS NOT EXAMINED]  Vital Signs: (As obtained by patient/caregiver or practitioner observation)    Blood pressure-  Heart rate-    Respiratory rate-    Temperature-  Pulse oximetry-     Constitutional: [] Appears well-developed and well-nourished [] No apparent distress      [] Abnormal-   Mental status  [] Alert and awake  [] Oriented to person/place/time []Able to follow commands      Eyes:  EOM    []  Normal  [] Abnormal-  Sclera  []  Normal  [] Abnormal -         Discharge []  None visible  [] Abnormal -    HENT:   [] Normocephalic, atraumatic. [] Abnormal   [] Mouth/Throat: Mucous membranes are moist.     External Ears [] Normal  [] Abnormal-     Neck: [] No visualized mass     Pulmonary/Chest: [] Respiratory effort normal.  [] No visualized signs of difficulty breathing or respiratory distress        [] Abnormal-      Musculoskeletal:   [] Normal gait with no signs of ataxia         [] Normal range of motion of neck        [] Abnormal-       Neurological:        [] No Facial Asymmetry (Cranial nerve 7 motor function) (limited exam to video visit)          [] No gaze palsy        [] Abnormal-         Skin:        [] No significant exanthematous lesions or discoloration noted on facial skin         [] Abnormal-            Psychiatric:       [] Normal Affect [] No Hallucinations        [] Abnormal-     Other pertinent observable physical exam findings-     ASSESSMENT/PLAN:  Evangelina Aleman was seen today for nephrolithiasis. Diagnoses and all orders for this visit:    Right flank pain  -     HYDROcodone-acetaminophen (NORCO) 5-325 MG per tablet; Take 1 tablet by mouth every 6 hours as needed for Pain for up to 3 days. Intended supply: 3 days. Take lowest dose possible to manage pain  -     CT ABDOMEN PELVIS WO CONTRAST Additional Contrast? None; Future    Nausea  -     HYDROcodone-acetaminophen (NORCO) 5-325 MG per tablet; Take 1 tablet by mouth every 6 hours as needed for Pain for up to 3 days. Intended supply: 3 days. Take lowest dose possible to manage pain  -     CT ABDOMEN PELVIS WO CONTRAST Additional Contrast? None; Future    Other orders  -     tamsulosin (FLOMAX) 0.4 MG capsule; Take 1 capsule by mouth daily       O[facility] report pulled and revealed no inconsistencies. Short term rx for norco and starting flomax.   Ct if does not pass  No follow-ups on file. Angelina Chambers is a 54 y.o. male being evaluated by a Virtual Visit (video visit) encounter to address concerns as mentioned above. A caregiver was present when appropriate. Due to this being a TeleHealth encounter (During [facility] public health emergency), evaluation of the following organ systems was limited: Vitals/Constitutional/EENT/Resp/CV/GI//MS/Neuro/Skin/Heme-Lymph-Imm. Pursuant to the emergency declaration under the [address] [place] [address], [address] [place] [address] authority and the [employer] Act, this Virtual Visit was conducted with patient's (and/or legal guardian's) consent, to reduce the patient's risk of exposure to COVID-19 and provide necessary medical care. The patient (and/or legal guardian) has also been advised to contact this office for worsening conditions or problems, and seek emergency medical treatment and/or call 911 if deemed necessary. Patient identification was verified at the start of the visit: Yes    Total time spent on this encounter: Not billed by time    Services were provided through a video synchronous discussion virtually to substitute for in-person clinic visit. Patient and provider were located at their individual homes. --Van Orr MD on 12/1/2020 at 10:39 AM    An electronic signature was used to authenticate this note.

## 2020-12-02 ENCOUNTER — HOSPITAL ENCOUNTER (OUTPATIENT)
Dept: PHYSICAL THERAPY | Age: 45
Setting detail: THERAPIES SERIES
Discharge: HOME | End: 2020-12-02
Payer: COMMERCIAL

## 2020-12-02 PROCEDURE — 97110 THERAPEUTIC EXERCISES: CPT

## 2020-12-02 PROCEDURE — 97140 MANUAL THERAPY 1/> REGIONS: CPT

## 2020-12-02 NOTE — FLOWSHEET NOTE
[employer]    Physical Therapy Treatment Note/ Progress Report:     Date:  2020    Patient Name:  Joao Moya    :  1975  MRN: [id number]  Medical/Treatment Diagnosis Information:  Diagnosis: S43.431D (R labral tear)  Treatment Diagnosis: R shoulder pain  Insurance/Certification information:  PT Insurance Information: UMR/[place]  Physician Information:  Referring Practitioner: Dr. Carol Mckee of care signed (Y/N):     Date of Patient follow up with Physician: Kortney Abbasi   Progress Report: []  Yes  [x]  No     Functional Scale: SPADI 71%     Date: 20    Date Range for reporting period:  Beginning:    Ending:      Progress report due (10 Rx/or 30 days whichever is less):     Recertification due (POC duration/ or 90 days whichever is less):  20    Visit # Insurance Allowable Auth Needed   8  [place] []Yes    [x]No     Pain level:  6-8/10     SUBJECTIVE: Reports significant decrease in aching in the shoulder, as well as, less NT past the elbow.      OBJECTIVE:    Observation:    Test measurements:    · 2020: PROM R shoulder: IR: [email or web address]@[employer]     RESTRICTIONS/PRECAUTIONS: known posterior labral tear- conservative treatment   Exercises/Interventions:   Therapeutic Ex Wt / Resistance Sets/sec Reps Notes   Scapular retractions  scap elevation/depression  3# 2 10    Supine D2 NPV    Attempted but painful    Bent Over Scaption  0# 2 10 30 because D2 flex was painful   SL ER 4# 2 15 Cues for Retraction   SL abduction   Straight elbow 1# 3 10    Bent Over Row   extension 4# 2 12 Hi-Hi/Lo  2/2 LBP   Bent Over Horiz ABD  No money  Red 2 10    Band ER/IR   rows/ extensions blue 2 10     IR towel   15\" 5x    Manual Intervention 10'       Shld /GH Mobs       Manual cervical traction   5 min      Thoracic/Rib manipulation       CT MT/Mobs       PROM MT  10 min   ABD/IR       Rhythmic Stabs   15\" 3x     NMR re-education          BB Yellow 15s  15s x3  x3 ER/IR  Flexion with good Retraction                                                   Therapeutic Exercise and NMR EXR  [x] (21565) Provided verbal/tactile cueing for activities related to strengthening, flexibility, endurance, ROM  for improvements in scapular, scapulothoracic and UE control with self care, reaching, carrying, lifting, house/yardwork, driving/computer work.    [] (77852) Provided verbal/tactile cueing for activities related to improving balance, coordination, kinesthetic sense, posture, motor skill, proprioception  to assist with  scapular, scapulothoracic and UE control with self care, reaching, carrying, lifting, house/yardwork, driving/computer work. Therapeutic Activities:    [] (75244 or 46249) Provided verbal/tactile cueing for activities related to improving balance, coordination, kinesthetic sense, posture, motor skill, proprioception and motor activation to allow for proper function of scapular, scapulothoracic and UE control with self care, carrying, lifting, driving/computer work.      Home Exercise Program:    [x] (59924) Reviewed/Progressed HEP activities related to strengthening, flexibility, endurance, ROM of scapular, scapulothoracic and UE control with self care, reaching, carrying, lifting, house/yardwork, driving/computer work  [] (20675) Reviewed/Progressed HEP activities related to improving balance, coordination, kinesthetic sense, posture, motor skill, proprioception of scapular, scapulothoracic and UE control with self care, reaching, carrying, lifting, house/yardwork, driving/computer work      Manual Treatments:  PROM / STM / Oscillations-Mobs:  G-I, II, III, IV (PA's, Inf., Post.)  [x] (41473) Provided manual therapy to mobilize soft tissue/joints of cervical/CT, scapular GHJ and UE for the purpose of modulating pain, promoting relaxation,  increasing ROM, reducing/eliminating soft tissue swelling/inflammation/restriction, improving soft tissue extensibility and allowing for proper ROM for normal function with self care, reaching, carrying, lifting, house/yardwork, driving/computer work    Modalities:      Charges:  Timed Code Treatment Minutes: 30   Total Treatment Minutes: 30min       [] EVAL (LOW) 56894 (typically 20 minutes face-to-face)  [] EVAL (MOD) 61059 (typically 30 minutes face-to-face)  [] EVAL (HIGH) 16177 (typically 45 minutes face-to-face)  [] RE-EVAL     [x] LB(00149) x 1    [] IONTO (57788)  [] NMR (44203) x     [] VASO (14949)  [x] Manual (83660) x 1    [] Other:  [] TA (51062)x     [] Mech Traction (41109)  [] ES(attended) (90066)     [] ES (un) (87714):       GOALS:  Patient stated goal: \"no shoulder pain\"   [] Progressing: [] Met: [x] Not Met: [] Adjusted    Therapist goals for Patient:   Short Term Goals: To be achieved in: 2 weeks  1. Independent in HEP and progression per patient tolerance, in order to prevent re-injury. [] Progressing: [] Met: [x] Not Met: [] Adjusted  2. Patient will have a decrease in pain to facilitate improvement in movement, function, and ADLs as indicated by Functional Deficits. [] Progressing: [] Met: [x] Not Met: [] Adjusted    Long Term Goals: To be achieved in: 8 weeks  1. Disability index score of 15% or less for the DASH to assist with reaching prior level of function. [] Progressing: [] Met: [x] Not Met: [] Adjusted  2. Patient will demonstrate increased AROM to [facility] [place] to allow for proper joint functioning as indicated by patients Functional Deficits. [] Progressing: [] Met: [x] Not Met: [] Adjusted  3. Patient will demonstrate an increase in Strength to [facility] [place] to allow for proper functional mobility as indicated by patients Functional Deficits. [] Progressing: [] Met: [x] Not Met: [] Adjusted  4. Patient will return to all functional activities without increased symptoms or restriction. [] Progressing: [] Met: [x] Not Met: [] Adjusted  5. Pt will reach behind back pain free. [] Progressing: [] Met: [x] Not Met: [] Adjusted     Progression Towards Functional goals:  [] Patient is progressing as expected towards functional goals listed. [] Progression is slowed due to complexities listed. [] Progression has been slowed due to co-morbidities. [x] Plan just implemented, too soon to assess goals progression  [] Other:     ASSESSMENT: Cervical spine screened with possible symptoms coming from cervical spine. Pt reports pain with R rotation in prone. Addressed cervical spine with manual traction and cervical retractions for HEP. Treatment/Activity Tolerance:  [x] Patient tolerated treatment well [] Patient limited by fatique  [] Patient limited by pain  [] Patient limited by other medical complications  [] Other:     Overall Progression Towards Functional goals/ Treatment Progress Update:  [] Patient is progressing as expected towards functional goals listed. [] Progression is slowed due to complexities/Impairments listed. [] Progression has been slowed due to co-morbidities. [x] Plan just implemented, too soon to assess goals progression <30days   [] Goals require adjustment due to lack of progress  [] Patient is not progressing as expected and requires additional follow up with physician  [] Other    Prognosis for POC: [x] Good [] Fair  [] Poor    Patient requires continued skilled intervention: [x] Yes  [] No      PLAN: Assess response to cervical spine focus      [x] Continue per plan of care [] Alter current plan (see comments)  [] Plan of care initiated [] Hold pending MD visit [] Discharge    Electronically signed by: Sacha Camarillo, PT    Note: If patient does not return for scheduled/recommended follow up visits, this note will serve as a discharge from care along with the most recent update on progress.

## 2020-12-08 ENCOUNTER — HOSPITAL ENCOUNTER (OUTPATIENT)
Dept: CT IMAGING | Age: 45
Discharge: HOME | End: 2020-12-08
Payer: COMMERCIAL

## 2020-12-08 PROCEDURE — 74176 CT ABD & PELVIS W/O CONTRAST: CPT

## 2020-12-09 ENCOUNTER — HOSPITAL ENCOUNTER (OUTPATIENT)
Dept: PHYSICAL THERAPY | Age: 45
Setting detail: THERAPIES SERIES
Discharge: HOME | End: 2020-12-09
Payer: COMMERCIAL

## 2020-12-09 PROCEDURE — 97140 MANUAL THERAPY 1/> REGIONS: CPT

## 2020-12-09 PROCEDURE — 97110 THERAPEUTIC EXERCISES: CPT

## 2020-12-09 NOTE — FLOWSHEET NOTE
[employer]    Physical Therapy Treatment Note/ Progress Report:     Date:  2020    Patient Name:  Hedy Lai    :  1975  MRN: [id number]  Medical/Treatment Diagnosis Information:  Diagnosis: K60.443C (R labral tear)  Treatment Diagnosis: R shoulder pain  Insurance/Certification information:  PT Insurance Information: UMR/[place]  Physician Information:  Referring Practitioner: Dr. Wing Hawk of care signed (Y/N):     Date of Patient follow up with Physician: Gigi Harris   Progress Report: []  Yes  [x]  No     Functional Scale: SPADI 71%     Date: 20    Date Range for reporting period:  Beginning:    Ending:      Progress report due (10 Rx/or 30 days whichever is less): 51    Recertification due (POC duration/ or 90 days whichever is less):  20    Visit # Insurance Allowable Auth Needed   9  [place] []Yes    [x]No     Pain level:  6-8/10     SUBJECTIVE: Reports significant decrease in aching in the shoulder, as well as, less NT     OBJECTIVE:    Observation:    Test measurements:    · 2020: PROM R shoulder: IR: [email or web address]     RESTRICTIONS/PRECAUTIONS: known posterior labral tear- conservative treatment   Exercises/Interventions:   Therapeutic Ex Wt / Resistance Sets/sec Reps Notes   Scapular retractions  scap elevation/depression  3# 2 10    Supine D2 NPV   11/30 Attempted but painful    Bent Over Scaption  0# 2 10 11/30 because D2 flex was painful   SL ER 3# 2 15 Cues for Retraction   SL abduction   Straight elbow 1# 3 10    Bent Over Row   extension 6# 2 12 Hi-Hi/Lo  2/2 LBP   Bent Over Horiz ABD  No money  Red 2 10    Band ER/IR   rows/ extensions blue 2 10     IR towel   15\" 5x    Manual Intervention 10'       Shld /GH Mobs       Manual cervical traction   5 min      Thoracic/Rib manipulation       CT MT/Mobs       PROM MT  10 min   ABD/IR       Rhythmic Stabs   15\" 3x     NMR re-education          BB black 30s  15s x3  x3 ER/IR  Flexion with good Retraction                                                   Therapeutic Exercise and NMR EXR  [x] (90365) Provided verbal/tactile cueing for activities related to strengthening, flexibility, endurance, ROM  for improvements in scapular, scapulothoracic and UE control with self care, reaching, carrying, lifting, house/yardwork, driving/computer work.    [] (50765) Provided verbal/tactile cueing for activities related to improving balance, coordination, kinesthetic sense, posture, motor skill, proprioception  to assist with  scapular, scapulothoracic and UE control with self care, reaching, carrying, lifting, house/yardwork, driving/computer work. Therapeutic Activities:    [] (25466 or 18930) Provided verbal/tactile cueing for activities related to improving balance, coordination, kinesthetic sense, posture, motor skill, proprioception and motor activation to allow for proper function of scapular, scapulothoracic and UE control with self care, carrying, lifting, driving/computer work.      Home Exercise Program:    [x] (47000) Reviewed/Progressed HEP activities related to strengthening, flexibility, endurance, ROM of scapular, scapulothoracic and UE control with self care, reaching, carrying, lifting, house/yardwork, driving/computer work  [] (55367) Reviewed/Progressed HEP activities related to improving balance, coordination, kinesthetic sense, posture, motor skill, proprioception of scapular, scapulothoracic and UE control with self care, reaching, carrying, lifting, house/yardwork, driving/computer work      Manual Treatments:  PROM / STM / Oscillations-Mobs:  G-I, II, III, IV (PA's, Inf., Post.)  [x] (60322) Provided manual therapy to mobilize soft tissue/joints of cervical/CT, scapular GHJ and UE for the purpose of modulating pain, promoting relaxation,  increasing ROM, reducing/eliminating soft tissue swelling/inflammation/restriction, improving soft tissue extensibility and allowing for proper ROM for normal function with self care, reaching, carrying, lifting, house/yardwork, driving/computer work    Modalities:      Charges:  Timed Code Treatment Minutes: 45 min    Total Treatment Minutes: 45 min       [] EVAL (LOW) 12341 (typically 20 minutes face-to-face)  [] EVAL (MOD) 51774 (typically 30 minutes face-to-face)  [] EVAL (HIGH) 20333 (typically 45 minutes face-to-face)  [] RE-EVAL     [x] DI(26268) x 1    [] IONTO (32753)  [] NMR (38649) x     [] VASO (66208)  [x] Manual (83635) x 2    [] Other:  [] TA (05762)x     [] Mech Traction (81598)  [] ES(attended) (52487)     [] ES (un) (43843):       GOALS:  Patient stated goal: \"no shoulder pain\"   [] Progressing: [] Met: [x] Not Met: [] Adjusted    Therapist goals for Patient:   Short Term Goals: To be achieved in: 2 weeks  1. Independent in HEP and progression per patient tolerance, in order to prevent re-injury. [] Progressing: [] Met: [x] Not Met: [] Adjusted  2. Patient will have a decrease in pain to facilitate improvement in movement, function, and ADLs as indicated by Functional Deficits. [] Progressing: [] Met: [x] Not Met: [] Adjusted    Long Term Goals: To be achieved in: 8 weeks  1. Disability index score of 15% or less for the DASH to assist with reaching prior level of function. [] Progressing: [] Met: [x] Not Met: [] Adjusted  2. Patient will demonstrate increased AROM to [facility] [place] to allow for proper joint functioning as indicated by patients Functional Deficits. [] Progressing: [] Met: [x] Not Met: [] Adjusted  3. Patient will demonstrate an increase in Strength to [facility] [place] to allow for proper functional mobility as indicated by patients Functional Deficits. [] Progressing: [] Met: [x] Not Met: [] Adjusted  4. Patient will return to all functional activities without increased symptoms or restriction. [] Progressing: [] Met: [x] Not Met: [] Adjusted  5. Pt will reach behind back pain free.    [] Progressing: [] Met: [x] Not Met: [] Adjusted     Progression Towards Functional goals:  [] Patient is progressing as expected towards functional goals listed. [] Progression is slowed due to complexities listed. [] Progression has been slowed due to co-morbidities. [x] Plan just implemented, too soon to assess goals progression  [] Other:     ASSESSMENT: Increased thoracic stiffness L>R, addressed with thoracic mobs and open books at the wall. Added to HEP     Treatment/Activity Tolerance:  [x] Patient tolerated treatment well [] Patient limited by fatique  [] Patient limited by pain  [] Patient limited by other medical complications  [] Other:     Overall Progression Towards Functional goals/ Treatment Progress Update:  [] Patient is progressing as expected towards functional goals listed. [] Progression is slowed due to complexities/Impairments listed. [] Progression has been slowed due to co-morbidities. [x] Plan just implemented, too soon to assess goals progression <30days   [] Goals require adjustment due to lack of progress  [] Patient is not progressing as expected and requires additional follow up with physician  [] Other    Prognosis for POC: [x] Good [] Fair  [] Poor    Patient requires continued skilled intervention: [x] Yes  [] No      PLAN: Assess response to cervical spine focus      [x] Continue per plan of care [] Alter current plan (see comments)  [] Plan of care initiated [] Hold pending MD visit [] Discharge    Electronically signed by: Sacha Camarillo, PT    Note: If patient does not return for scheduled/recommended follow up visits, this note will serve as a discharge from care along with the most recent update on progress.

## 2020-12-11 ENCOUNTER — HOSPITAL ENCOUNTER (OUTPATIENT)
Dept: PHYSICAL THERAPY | Age: 45
Setting detail: THERAPIES SERIES
Discharge: HOME | End: 2020-12-11
Payer: COMMERCIAL

## 2020-12-11 PROCEDURE — 97140 MANUAL THERAPY 1/> REGIONS: CPT

## 2020-12-11 PROCEDURE — 97110 THERAPEUTIC EXERCISES: CPT

## 2020-12-11 NOTE — FLOWSHEET NOTE
allowing for proper ROM for normal function with self care, reaching, carrying, lifting, house/yardwork, driving/computer work    Modalities:      Charges:  Timed Code Treatment Minutes: 45 min    Total Treatment Minutes: 45 min       [] EVAL (LOW) 54789 (typically 20 minutes face-to-face)  [] EVAL (MOD) 36385 (typically 30 minutes face-to-face)  [] EVAL (HIGH) 62722 (typically 45 minutes face-to-face)  [] RE-EVAL     [x] TB(82860) x 1    [] IONTO (47578)  [] NMR (15803) x     [] VASO (79694)  [x] Manual (23619) x 2    [] Other:  [] TA (92420)x     [] Mech Traction (54311)  [] ES(attended) (83620)     [] ES (un) (02518):       GOALS:  Patient stated goal: \"no shoulder pain\"   [] Progressing: [] Met: [x] Not Met: [] Adjusted    Therapist goals for Patient:   Short Term Goals: To be achieved in: 2 weeks  1. Independent in HEP and progression per patient tolerance, in order to prevent re-injury. [] Progressing: [] Met: [x] Not Met: [] Adjusted  2. Patient will have a decrease in pain to facilitate improvement in movement, function, and ADLs as indicated by Functional Deficits. [] Progressing: [] Met: [x] Not Met: [] Adjusted    Long Term Goals: To be achieved in: 8 weeks  1. Disability index score of 15% or less for the DASH to assist with reaching prior level of function. [] Progressing: [] Met: [x] Not Met: [] Adjusted  2. Patient will demonstrate increased AROM to [facility] [place] to allow for proper joint functioning as indicated by patients Functional Deficits. [] Progressing: [] Met: [x] Not Met: [] Adjusted  3. Patient will demonstrate an increase in Strength to [facility] [place] to allow for proper functional mobility as indicated by patients Functional Deficits. [] Progressing: [] Met: [x] Not Met: [] Adjusted  4. Patient will return to all functional activities without increased symptoms or restriction. [] Progressing: [] Met: [x] Not Met: [] Adjusted  5. Pt will reach behind back pain free.    [] Progressing: [] Met: [x] Not Met: [] Adjusted     Progression Towards Functional goals:  [] Patient is progressing as expected towards functional goals listed. [] Progression is slowed due to complexities listed. [] Progression has been slowed due to co-morbidities. [x] Plan just implemented, too soon to assess goals progression  [] Other:     ASSESSMENT: Increased thoracic stiffness L>R, addressed with thoracic mobs and open books at the wall. Added to HEP     Treatment/Activity Tolerance:  [x] Patient tolerated treatment well [] Patient limited by fatique  [] Patient limited by pain  [] Patient limited by other medical complications  [] Other:     Overall Progression Towards Functional goals/ Treatment Progress Update:  [] Patient is progressing as expected towards functional goals listed. [] Progression is slowed due to complexities/Impairments listed. [] Progression has been slowed due to co-morbidities. [x] Plan just implemented, too soon to assess goals progression <30days   [] Goals require adjustment due to lack of progress  [] Patient is not progressing as expected and requires additional follow up with physician  [] Other    Prognosis for POC: [x] Good [] Fair  [] Poor    Patient requires continued skilled intervention: [x] Yes  [] No      PLAN: Assess response to cervical spine focus      [x] Continue per plan of care [] Alter current plan (see comments)  [] Plan of care initiated [] Hold pending MD visit [] Discharge    Electronically signed by: Sacha Camarillo PT    Note: If patient does not return for scheduled/recommended follow up visits, this note will serve as a discharge from care along with the most recent update on progress.

## 2020-12-16 ENCOUNTER — HOSPITAL ENCOUNTER (OUTPATIENT)
Dept: PHYSICAL THERAPY | Age: 45
Setting detail: THERAPIES SERIES
Discharge: HOME | End: 2020-12-16
Payer: COMMERCIAL

## 2020-12-16 PROCEDURE — 97110 THERAPEUTIC EXERCISES: CPT

## 2020-12-16 PROCEDURE — 97112 NEUROMUSCULAR REEDUCATION: CPT

## 2020-12-16 PROCEDURE — 97140 MANUAL THERAPY 1/> REGIONS: CPT

## 2020-12-16 NOTE — FLOWSHEET NOTE
[facility]    Physical Therapy Treatment Note/ Progress Report:     Date:  2020    Patient Name:  Heladio Childress    :  1975  MRN: [id number]  Medical/Treatment Diagnosis Information:  Diagnosis: S43.431D (R labral tear)  Treatment Diagnosis: R shoulder pain  Insurance/Certification information:  PT Insurance Information: UMR/[place]OP [place]  Physician Information:  Referring Practitioner: Dr. Mirtha Woody of care signed (Y/N):     Date of Patient follow up with Physician: Malinda Young   Progress Report: []  Yes  [x]  No     Functional Scale: SPADI 71%     Date: 20    Date Range for reporting period:  Beginning:    Ending:      Progress report due (10 Rx/or 30 days whichever is less): 61    Recertification due (POC duration/ or 90 days whichever is less):  20    Visit # Insurance Allowable Auth Needed   10 [place] []Yes    [x]No     Pain level:  6-8/10     SUBJECTIVE: Less NT reported in the hand. Overall symptoms continue to improve.      OBJECTIVE:    Observation:    Test measurements:    · 2020: PROM R shoulder: IR: [email or web address]     RESTRICTIONS/PRECAUTIONS: known posterior labral tear- conservative treatment   Exercises/Interventions:   Therapeutic Ex Wt / Resistance Sets/sec Reps Notes   Supine D2 NPV    Attempted but painful    Bent Over Scaption  0# 2 10  because D2 flex was painful   SL ER 3# 2 15 Cues for Retraction   SL abduction   Straight elbow 1# 3 10    Bent Over Row   extension 6# 2 12 Hi-Hi/Lo  2/2 LBP   Bent Over Horiz ABD  No money  Red 2 10    Band ER/IR   rows/ extensions blue 2 10     IR towel   15\" 5x    Manual Intervention 10'       Shld /GH Mobs       Manual cervical traction   5 min      Thoracic/Rib manipulation  3 min     CT MT/Mobs       PROM MT  10 min   ABD/IR    Rhythmic Stabs   15\" 3x     NMR re-education       BB black 30s  15s x3  x3 ER/IR  Flexion with good Retraction     Wall clocks Y 2 10    Water stick 2# 15s x4 Single UE                                    Therapeutic Exercise and NMR EXR  [x] (14827) Provided verbal/tactile cueing for activities related to strengthening, flexibility, endurance, ROM  for improvements in scapular, scapulothoracic and UE control with self care, reaching, carrying, lifting, house/yardwork, driving/computer work.    [] (82122) Provided verbal/tactile cueing for activities related to improving balance, coordination, kinesthetic sense, posture, motor skill, proprioception  to assist with  scapular, scapulothoracic and UE control with self care, reaching, carrying, lifting, house/yardwork, driving/computer work. Therapeutic Activities:    [] (11181 or 56237) Provided verbal/tactile cueing for activities related to improving balance, coordination, kinesthetic sense, posture, motor skill, proprioception and motor activation to allow for proper function of scapular, scapulothoracic and UE control with self care, carrying, lifting, driving/computer work.      Home Exercise Program:    [x] (81573) Reviewed/Progressed HEP activities related to strengthening, flexibility, endurance, ROM of scapular, scapulothoracic and UE control with self care, reaching, carrying, lifting, house/yardwork, driving/computer work  [] (18460) Reviewed/Progressed HEP activities related to improving balance, coordination, kinesthetic sense, posture, motor skill, proprioception of scapular, scapulothoracic and UE control with self care, reaching, carrying, lifting, house/yardwork, driving/computer work      Manual Treatments:  PROM / STM / Oscillations-Mobs:  G-I, II, III, IV (PA's, Inf., Post.)  [x] (60617) Provided manual therapy to mobilize soft tissue/joints of cervical/CT, scapular GHJ and UE for the purpose of modulating pain, promoting relaxation,  increasing ROM, reducing/eliminating soft tissue swelling/inflammation/restriction, improving soft tissue extensibility and allowing for proper ROM for normal function with self care, reaching, carrying, lifting, house/yardwork, driving/computer work    Modalities:      Charges:  Timed Code Treatment Minutes: 45 min    Total Treatment Minutes: 45 min       [] EVAL (LOW) 00227 (typically 20 minutes face-to-face)  [] EVAL (MOD) 81682 (typically 30 minutes face-to-face)  [] EVAL (HIGH) 34615 (typically 45 minutes face-to-face)  [] RE-EVAL     [x] JL(58858) x 1    [] IONTO (29547)  [] NMR (51097) x     [] VASO (23278)  [x] Manual (12076) x 2    [] Other:  [] TA (40987)x     [] Mech Traction (57041)  [] ES(attended) (13938)     [] ES (un) (35657):       GOALS:  Patient stated goal: \"no shoulder pain\"   [] Progressing: [] Met: [x] Not Met: [] Adjusted    Therapist goals for Patient:   Short Term Goals: To be achieved in: 2 weeks  1. Independent in HEP and progression per patient tolerance, in order to prevent re-injury. [] Progressing: [] Met: [x] Not Met: [] Adjusted  2. Patient will have a decrease in pain to facilitate improvement in movement, function, and ADLs as indicated by Functional Deficits. [] Progressing: [] Met: [x] Not Met: [] Adjusted    Long Term Goals: To be achieved in: 8 weeks  1. Disability index score of 15% or less for the DASH to assist with reaching prior level of function. [] Progressing: [] Met: [x] Not Met: [] Adjusted  2. Patient will demonstrate increased [facility]OM to [facility] [place] to allow for proper joint functioning as indicated by patients Functional Deficits. [] Progressing: [] Met: [x] Not Met: [] Adjusted  3. Patient will demonstrate an increase in Strength to [facility] [place] to allow for proper functional mobility as indicated by patients Functional Deficits. [] Progressing: [] Met: [x] Not Met: [] Adjusted  4. Patient will return to all functional activities without increased symptoms or restriction. [] Progressing: [] Met: [x] Not Met: [] Adjusted  5. Pt will reach behind back pain free.    [] Progressing: [] Met: [x] Not Met: [] Adjusted     Progression Towards Functional goals:  [] Patient is progressing as expected towards functional goals listed. [] Progression is slowed due to complexities listed. [] Progression has been slowed due to co-morbidities. [x] Plan just implemented, too soon to assess goals progression  [] Other:     ASSESSMENT: Increased thoracic stiffness L>R, addressed with thoracic mobs and open books at the wall. Added to HEP     Treatment/Activity Tolerance:  [x] Patient tolerated treatment well [] Patient limited by fatique  [] Patient limited by pain  [] Patient limited by other medical complications  [] Other:     Overall Progression Towards Functional goals/ Treatment Progress Update:  [] Patient is progressing as expected towards functional goals listed. [] Progression is slowed due to complexities/Impairments listed. [] Progression has been slowed due to co-morbidities. [x] Plan just implemented, too soon to assess goals progression <30days   [] Goals require adjustment due to lack of progress  [] Patient is not progressing as expected and requires additional follow up with physician  [] Other    Prognosis for POC: [x] Good [] Fair  [] Poor    Patient requires continued skilled intervention: [x] Yes  [] No      PLAN: Assess response to cervical spine focus      [x] Continue per plan of care [] Alter current plan (see comments)  [] Plan of care initiated [] Hold pending MD visit [] Discharge    Electronically signed by: Sacha Camarillo PT    Note: If patient does not return for scheduled/recommended follow up visits, this note will serve as a discharge from care along with the most recent update on progress.

## 2020-12-18 ENCOUNTER — HOSPITAL ENCOUNTER (OUTPATIENT)
Dept: PHYSICAL THERAPY | Age: 45
Setting detail: THERAPIES SERIES
Discharge: HOME | End: 2020-12-18
Payer: COMMERCIAL

## 2020-12-18 PROCEDURE — 97140 MANUAL THERAPY 1/> REGIONS: CPT

## 2020-12-18 PROCEDURE — 97112 NEUROMUSCULAR REEDUCATION: CPT

## 2020-12-18 PROCEDURE — 97110 THERAPEUTIC EXERCISES: CPT

## 2020-12-18 NOTE — FLOWSHEET NOTE
[employer]    Physical Therapy Treatment Note/ Progress Report:     Date:  2020    Patient Name:  Krys Pineda    :  1975  MRN: [id number]  Medical/Treatment Diagnosis Information:  Diagnosis: S43.431D (R labral tear)  Treatment Diagnosis: R shoulder pain  Insurance/Certification information:  PT Insurance Information: UMR/[place]  Physician Information:  Referring Practitioner: Dr. Jeff Juan of care signed (Y/N):     Date of Patient follow up with Physician: Rand Gould   Progress Report: []  Yes  [x]  No     Functional Scale: SPADI 71%     Date: 20    Date Range for reporting period:  Beginning:    Ending:      Progress report due (10 Rx/or 30 days whichever is less):     Recertification due (POC duration/ or 90 days whichever is less):  20    Visit # Insurance Allowable Auth Needed   10 [place] []Yes    [x]No     Pain level:  6-8/10     SUBJECTIVE: Less NT reported in the hand. Overall symptoms continue to improve.      OBJECTIVE:    Observation:    Test measurements:    · 2020: PROM R shoulder: IR: [email or web address]     RESTRICTIONS/PRECAUTIONS: known posterior labral tear- conservative treatment   Exercises/Interventions:   Therapeutic Ex Wt / Resistance Sets/sec Reps Notes   Supine D2 NPV    Attempted but painful    Bent Over Scaption  0# 2 10  because D2 flex was painful   SL ER 3# 2 15 Cues for Retraction   SL abduction   Straight elbow 1# 3 10    Bent Over Row   extension 6# 2 12 Hi-Hi/Lo  2/2 LBP   Bent Over Horiz ABD  No money  Red 2 10    Band ER/IR   rows/ extensions blue 2 10     IR towel   15\" 5x    Manual Intervention 10'       Shld /GH Mobs       Manual cervical traction   5 min      Thoracic/Rib manipulation  3 min     CT MT/Mobs       PROM MT  10 min   ABD/IR    Rhythmic Stabs   15\" 3x     NMR re-education       BB black 30s  15s x3  x3 ER/IR  Flexion with good Retraction     Wall clocks Y 2 10    Water stick 2# 15s x4 Single UE                                    Therapeutic Exercise and NMR EXR  [x] (92748) Provided verbal/tactile cueing for activities related to strengthening, flexibility, endurance, ROM  for improvements in scapular, scapulothoracic and UE control with self care, reaching, carrying, lifting, house/yardwork, driving/computer work.    [] (32951) Provided verbal/tactile cueing for activities related to improving balance, coordination, kinesthetic sense, posture, motor skill, proprioception  to assist with  scapular, scapulothoracic and UE control with self care, reaching, carrying, lifting, house/yardwork, driving/computer work. Therapeutic Activities:    [] (51702 or 40617) Provided verbal/tactile cueing for activities related to improving balance, coordination, kinesthetic sense, posture, motor skill, proprioception and motor activation to allow for proper function of scapular, scapulothoracic and UE control with self care, carrying, lifting, driving/computer work.      Home Exercise Program:    [x] (46324) Reviewed/Progressed HEP activities related to strengthening, flexibility, endurance, ROM of scapular, scapulothoracic and UE control with self care, reaching, carrying, lifting, house/yardwork, driving/computer work  [] (93175) Reviewed/Progressed HEP activities related to improving balance, coordination, kinesthetic sense, posture, motor skill, proprioception of scapular, scapulothoracic and UE control with self care, reaching, carrying, lifting, house/yardwork, driving/computer work      Manual Treatments:  PROM / STM / Oscillations-Mobs:  G-I, II, III, IV (PA's, Inf., Post.)  [x] (30445) Provided manual therapy to mobilize soft tissue/joints of cervical/CT, scapular GHJ and UE for the purpose of modulating pain, promoting relaxation,  increasing ROM, reducing/eliminating soft tissue swelling/inflammation/restriction, improving soft tissue extensibility and allowing for proper ROM for normal function with self care, reaching, carrying, lifting, house/yardwork, driving/computer work    Modalities:      Charges:  Timed Code Treatment Minutes: 45 min    Total Treatment Minutes: 45 min       [] EVAL (LOW) 08464 (typically 20 minutes face-to-face)  [] EVAL (MOD) 82743 (typically 30 minutes face-to-face)  [] EVAL (HIGH) 61554 (typically 45 minutes face-to-face)  [] RE-EVAL     [x] IO(07526) x 1    [] IONTO (97836)  [] NMR (22369) x     [] VASO (64292)  [x] Manual (36477) x 2    [] Other:  [] TA (02890)x     [] Mech Traction (13119)  [] ES(attended) (67511)     [] ES (un) (03001):       GOALS:  Patient stated goal: \"no shoulder pain\"   [] Progressing: [] Met: [x] Not Met: [] Adjusted    Therapist goals for Patient:   Short Term Goals: To be achieved in: 2 weeks  1. Independent in HEP and progression per patient tolerance, in order to prevent re-injury. [] Progressing: [] Met: [x] Not Met: [] Adjusted  2. Patient will have a decrease in pain to facilitate improvement in movement, function, and ADLs as indicated by Functional Deficits. [] Progressing: [] Met: [x] Not Met: [] Adjusted    Long Term Goals: To be achieved in: 8 weeks  1. Disability index score of 15% or less for the DASH to assist with reaching prior level of function. [] Progressing: [] Met: [x] Not Met: [] Adjusted  2. Patient will demonstrate increased AROM to [facility] [employer] to allow for proper joint functioning as indicated by patients Functional Deficits. [] Progressing: [] Met: [x] Not Met: [] Adjusted  3. Patient will demonstrate an increase in Strength to [facility] [employer] to allow for proper functional mobility as indicated by patients Functional Deficits. [] Progressing: [] Met: [x] Not Met: [] Adjusted  4. Patient will return to all functional activities without increased symptoms or restriction. [] Progressing: [] Met: [x] Not Met: [] Adjusted  5. Pt will reach behind back pain free.    [] Progressing: [] Met: [x] Not Met: [] Adjusted     Progression Towards Functional goals:  [] Patient is progressing as expected towards functional goals listed. [] Progression is slowed due to complexities listed. [] Progression has been slowed due to co-morbidities. [x] Plan just implemented, too soon to assess goals progression  [] Other:     ASSESSMENT: Session focused on continued progression of R shoulder and periscapular strength, stability, and motor control to address remaining strength deficits. Plan to continue PT for another 2-4 weeks to focus on remaining strength deficits and to ensure comfort with comprehensive HEP that patient can continue on her own upon discharge. Treatment/Activity Tolerance:  [x] Patient tolerated treatment well [] Patient limited by fatique  [] Patient limited by pain  [] Patient limited by other medical complications  [] Other:     Overall Progression Towards Functional goals/ Treatment Progress Update:  [] Patient is progressing as expected towards functional goals listed. [] Progression is slowed due to complexities/Impairments listed. [] Progression has been slowed due to co-morbidities. [x] Plan just implemented, too soon to assess goals progression <30days   [] Goals require adjustment due to lack of progress  [] Patient is not progressing as expected and requires additional follow up with physician  [] Other    Prognosis for POC: [x] Good [] Fair  [] Poor    Patient requires continued skilled intervention: [x] Yes  [] No      PLAN: Progress periscapular strengthening while addressing cervical component of pain     [x] Continue per plan of care [] Alter current plan (see comments)  [] Plan of care initiated [] Hold pending MD visit [] Discharge    Electronically signed by: Sacha Camarillo, PT    Note: If patient does not return for scheduled/recommended follow up visits, this note will serve as a discharge from care along with the most recent update on progress.

## 2021-01-05 ENCOUNTER — HOSPITAL ENCOUNTER (OUTPATIENT)
Dept: PHYSICAL THERAPY | Age: 46
Setting detail: THERAPIES SERIES
Discharge: HOME | End: 2021-01-05
Payer: COMMERCIAL

## 2021-01-05 PROCEDURE — 97112 NEUROMUSCULAR REEDUCATION: CPT

## 2021-01-05 PROCEDURE — 97110 THERAPEUTIC EXERCISES: CPT

## 2021-01-05 PROCEDURE — 97140 MANUAL THERAPY 1/> REGIONS: CPT

## 2021-01-05 NOTE — FLOWSHEET NOTE
[employer] [employer] [facility]    Physical Therapy Treatment Note/ Progress Report:     Date:  2021    Patient Name:  Carolee Rea    :  1975  MRN: [id number]  Medical/Treatment Diagnosis Information:  Diagnosis: S43.431D (R labral tear)  Treatment Diagnosis: R shoulder pain  Insurance/Certification information:  PT Insurance Information: UMR/[place]OP [place]  Physician Information:  Referring Practitioner: Dr. Angel Zepeda of care signed (Y/N):     Date of Patient follow up with Physician: Werner Anderson   Progress Report: []  Yes  [x]  No     Functional Scale: SPADI 71%     Date: 20    Date Range for reporting period:  Beginning:    Ending:      Progress report due (10 Rx/or 30 days whichever is less):     Recertification due (POC duration/ or 90 days whichever is less):  20    Visit # Insurance Allowable Auth Needed   10 [place] []Yes    [x]No     Pain level:  6-8/10     SUBJECTIVE: Less NT reported in the hand, has to really try to induce NT.  Reports no ache or pain in the shoulder     OBJECTIVE:    Observation:    Test measurements:    · 2020: PROM R shoulder: IR: [email or web address]     RESTRICTIONS/PRECAUTIONS: known posterior labral tear- conservative treatment   Exercises/Interventions:   Therapeutic Ex Wt / Resistance Sets/sec Reps Notes   Supine D2 RED      Bent Over Scaption  0# 2 10  because D2 flex was painful   SL ER 3# 2 15 Cues for Retraction   SL abduction   Straight elbow 1# 3 10    Bent Over Row   extension 6# 2 12 Hi-Hi/Lo  2/2 LBP   Bent Over Horiz ABD  No money  blue 2 10    Band ER/IR   rows/ extensions blue 2 10     IR towel   15\" 5x    Manual Intervention 10'       Shld /GH Mobs       Manual cervical traction   5 min      Thoracic/Rib manipulation  3 min     CT MT/Mobs       PROM MT  10 min   ABD/IR    Rhythmic Stabs   15\" 3x     NMR re-education       BB black 30s  15s x3  x3 ER/IR  Flexion with good Retraction Wall clocks Y 2 10    Water stick 2# 15s x4 Single UE                                    Therapeutic Exercise and NMR EXR  [x] (83282) Provided verbal/tactile cueing for activities related to strengthening, flexibility, endurance, ROM  for improvements in scapular, scapulothoracic and UE control with self care, reaching, carrying, lifting, house/yardwork, driving/computer work.    [] (43046) Provided verbal/tactile cueing for activities related to improving balance, coordination, kinesthetic sense, posture, motor skill, proprioception  to assist with  scapular, scapulothoracic and UE control with self care, reaching, carrying, lifting, house/yardwork, driving/computer work. Therapeutic Activities:    [] (11543 or 01339) Provided verbal/tactile cueing for activities related to improving balance, coordination, kinesthetic sense, posture, motor skill, proprioception and motor activation to allow for proper function of scapular, scapulothoracic and UE control with self care, carrying, lifting, driving/computer work.      Home Exercise Program:    [x] (74419) Reviewed/Progressed HEP activities related to strengthening, flexibility, endurance, ROM of scapular, scapulothoracic and UE control with self care, reaching, carrying, lifting, house/yardwork, driving/computer work  [] (81864) Reviewed/Progressed HEP activities related to improving balance, coordination, kinesthetic sense, posture, motor skill, proprioception of scapular, scapulothoracic and UE control with self care, reaching, carrying, lifting, house/yardwork, driving/computer work      Manual Treatments:  PROM / STM / Oscillations-Mobs:  G-I, II, III, IV (PA's, Inf., Post.)  [x] (33204) Provided manual therapy to mobilize soft tissue/joints of cervical/CT, scapular GHJ and UE for the purpose of modulating pain, promoting relaxation,  increasing ROM, reducing/eliminating soft tissue swelling/inflammation/restriction, improving soft tissue extensibility and allowing for proper ROM for normal function with self care, reaching, carrying, lifting, house/yardwork, driving/computer work    Modalities:      Charges:  Timed Code Treatment Minutes: 45 min    Total Treatment Minutes: 45 min       [] EVAL (LOW) 68218 (typically 20 minutes face-to-face)  [] EVAL (MOD) 58313 (typically 30 minutes face-to-face)  [] EVAL (HIGH) 01293 (typically 45 minutes face-to-face)  [] RE-EVAL     [x] DF(12262) x 1    [] IONTO (73297)  [] NMR (78941) x     [] VASO (06967)  [x] Manual (80461) x 2    [] Other:  [] TA (51222)x     [] Mech Traction (12882)  [] ES(attended) (40294)     [] ES (un) (76564):       GOALS:  Patient stated goal: \"no shoulder pain\"   [] Progressing: [] Met: [x] Not Met: [] Adjusted    Therapist goals for Patient:   Short Term Goals: To be achieved in: 2 weeks  1. Independent in HEP and progression per patient tolerance, in order to prevent re-injury. [] Progressing: [] Met: [x] Not Met: [] Adjusted  2. Patient will have a decrease in pain to facilitate improvement in movement, function, and ADLs as indicated by Functional Deficits. [] Progressing: [] Met: [x] Not Met: [] Adjusted    Long Term Goals: To be achieved in: 8 weeks  1. Disability index score of 15% or less for the DASH to assist with reaching prior level of function. [] Progressing: [] Met: [x] Not Met: [] Adjusted  2. Patient will demonstrate increased AROM to [facility] [place] to allow for proper joint functioning as indicated by patients Functional Deficits. [] Progressing: [] Met: [x] Not Met: [] Adjusted  3. Patient will demonstrate an increase in Strength to [facility] [place] to allow for proper functional mobility as indicated by patients Functional Deficits. [] Progressing: [] Met: [x] Not Met: [] Adjusted  4. Patient will return to all functional activities without increased symptoms or restriction. [] Progressing: [] Met: [x] Not Met: [] Adjusted  5. Pt will reach behind back pain free.    [] Progressing: [] Met: [x] Not Met: [] Adjusted     Progression Towards Functional goals:  [] Patient is progressing as expected towards functional goals listed. [] Progression is slowed due to complexities listed. [] Progression has been slowed due to co-morbidities. [x] Plan just implemented, too soon to assess goals progression  [] Other:     ASSESSMENT: Session focused on continued progression of R shoulder and periscapular strength, stability, and motor control to address remaining strength deficits. Plan to continue PT for another 2-4 weeks to focus on remaining strength deficits and to ensure comfort with comprehensive HEP that patient can continue on her own upon discharge. No NT reported within treatment session      Treatment/Activity Tolerance:  [x] Patient tolerated treatment well [] Patient limited by fatique  [] Patient limited by pain  [] Patient limited by other medical complications  [] Other:     Overall Progression Towards Functional goals/ Treatment Progress Update:  [] Patient is progressing as expected towards functional goals listed. [] Progression is slowed due to complexities/Impairments listed. [] Progression has been slowed due to co-morbidities. [x] Plan just implemented, too soon to assess goals progression <30days   [] Goals require adjustment due to lack of progress  [] Patient is not progressing as expected and requires additional follow up with physician  [] Other    Prognosis for POC: [x] Good [] Fair  [] Poor    Patient requires continued skilled intervention: [x] Yes  [] No      PLAN: Progress periscapular strengthening while addressing cervical component of pain     [x] Continue per plan of care [] Alter current plan (see comments)  [] Plan of care initiated [] Hold pending MD visit [] Discharge    Electronically signed by:  Sacha Camarillo, PT    Note: If patient does not return for scheduled/recommended follow up visits, this note will serve as a discharge from care along with the most recent update on progress.

## 2021-01-06 DIAGNOSIS — N20.0 KIDNEY STONE: Primary | ICD-10-CM

## 2021-01-07 ENCOUNTER — HOSPITAL ENCOUNTER (OUTPATIENT)
Dept: PHYSICAL THERAPY | Age: 46
Setting detail: THERAPIES SERIES
Discharge: HOME | End: 2021-01-07
Payer: COMMERCIAL

## 2021-01-07 PROCEDURE — 97110 THERAPEUTIC EXERCISES: CPT

## 2021-01-07 PROCEDURE — 97112 NEUROMUSCULAR REEDUCATION: CPT

## 2021-01-07 PROCEDURE — 97140 MANUAL THERAPY 1/> REGIONS: CPT

## 2021-01-07 NOTE — FLOWSHEET NOTE
[employer]    Physical Therapy Treatment Note/ Progress Report:     Date:  2021    Patient Name:  Kel Botello    :  1975  MRN: [id number]  Medical/Treatment Diagnosis Information:  Diagnosis: T91.969B (R labral tear)  Treatment Diagnosis: R shoulder pain  Insurance/Certification information:  PT Insurance Information: UMR/[place]  Physician Information:  Referring Practitioner: Dr. Trae Noland of care signed (Y/N):     Date of Patient follow up with Physician: Zoila Vu   Progress Report: []  Yes  [x]  No     Functional Scale: SPADI 71%     Date: 20    Date Range for reporting period:  Beginning:    Ending:      Progress report due (10 Rx/or 30 days whichever is less):     Recertification due (POC duration/ or 90 days whichever is less):  20    Visit # Insurance Allowable Auth Needed   11 [place] []Yes    [x]No     Pain level:  6-8/10     SUBJECTIVE: A little more NT noted in hand today, unable to determine reasoning     OBJECTIVE:    Observation:    Test measurements:    · 2020: PROM R shoulder: IR: [email or web address]@[employer]     RESTRICTIONS/PRECAUTIONS: known posterior labral tear- conservative treatment   Exercises/Interventions:   Therapeutic Ex Wt / Resistance Sets/sec Reps Notes   Supine D2 RED      Bent Over Scaption  0# 2 10  because D2 flex was painful   SL ER 3# 2 15 Cues for Retraction   SL abduction   Straight elbow 1# 3 10    Bent Over Row   extension 6# 2 12 Hi-Hi/Lo  2/2 LBP   Bent Over Horiz ABD  No money  blue 2 10    Band ER/IR   rows/ extensions blue 2 10    Band ER at 90 R 2 10     IR towel   15\" 5x    Manual Intervention 10'       Shld /GH Mobs       Manual cervical traction   5 min      Thoracic/Rib manipulation  3 min     CT MT/Mobs       PROM MT  10 min   ABD/IR    Rhythmic Stabs   15\" 3x     NMR re-education       BB black 30s  15s x3  x3 ER/IR  Flexion with good Retraction     Wall clocks Y 2 10    Water stick 2# 15s x4 Single UE                                    Therapeutic Exercise and NMR EXR  [x] (04071) Provided verbal/tactile cueing for activities related to strengthening, flexibility, endurance, ROM  for improvements in scapular, scapulothoracic and UE control with self care, reaching, carrying, lifting, house/yardwork, driving/computer work.    [] (14687) Provided verbal/tactile cueing for activities related to improving balance, coordination, kinesthetic sense, posture, motor skill, proprioception  to assist with  scapular, scapulothoracic and UE control with self care, reaching, carrying, lifting, house/yardwork, driving/computer work. Therapeutic Activities:    [] (36617 or 18621) Provided verbal/tactile cueing for activities related to improving balance, coordination, kinesthetic sense, posture, motor skill, proprioception and motor activation to allow for proper function of scapular, scapulothoracic and UE control with self care, carrying, lifting, driving/computer work.      Home Exercise Program:    [x] (00924) Reviewed/Progressed HEP activities related to strengthening, flexibility, endurance, ROM of scapular, scapulothoracic and UE control with self care, reaching, carrying, lifting, house/yardwork, driving/computer work  [] (32908) Reviewed/Progressed HEP activities related to improving balance, coordination, kinesthetic sense, posture, motor skill, proprioception of scapular, scapulothoracic and UE control with self care, reaching, carrying, lifting, house/yardwork, driving/computer work      Manual Treatments:  PROM / STM / Oscillations-Mobs:  G-I, II, III, IV (PA's, Inf., Post.)  [x] (59198) Provided manual therapy to mobilize soft tissue/joints of cervical/CT, scapular GHJ and UE for the purpose of modulating pain, promoting relaxation,  increasing ROM, reducing/eliminating soft tissue swelling/inflammation/restriction, improving soft tissue extensibility and allowing for proper ROM for normal function with self care, reaching, carrying, lifting, house/yardwork, driving/computer work    Modalities:      Charges:  Timed Code Treatment Minutes: 45 min    Total Treatment Minutes: 45 min       [] EVAL (LOW) 11969 (typically 20 minutes face-to-face)  [] EVAL (MOD) 76905 (typically 30 minutes face-to-face)  [] EVAL (HIGH) 04742 (typically 45 minutes face-to-face)  [] RE-EVAL     [x] YD(88443) x 1    [] IONTO (32934)  [] NMR (03456) x     [] VASO (04801)  [x] Manual (77893) x 2    [] Other:  [] TA (59771)x     [] Mech Traction (49318)  [] ES(attended) (22533)     [] ES (un) (85422):       GOALS:  Patient stated goal: \"no shoulder pain\"   [] Progressing: [] Met: [x] Not Met: [] Adjusted    Therapist goals for Patient:   Short Term Goals: To be achieved in: 2 weeks  1. Independent in HEP and progression per patient tolerance, in order to prevent re-injury. [] Progressing: [] Met: [x] Not Met: [] Adjusted  2. Patient will have a decrease in pain to facilitate improvement in movement, function, and ADLs as indicated by Functional Deficits. [] Progressing: [] Met: [x] Not Met: [] Adjusted    Long Term Goals: To be achieved in: 8 weeks  1. Disability index score of 15% or less for the DASH to assist with reaching prior level of function. [] Progressing: [] Met: [x] Not Met: [] Adjusted  2. Patient will demonstrate increased AROM to [facility] [place] to allow for proper joint functioning as indicated by patients Functional Deficits. [] Progressing: [] Met: [x] Not Met: [] Adjusted  3. Patient will demonstrate an increase in Strength to [facility] [place] to allow for proper functional mobility as indicated by patients Functional Deficits. [] Progressing: [] Met: [x] Not Met: [] Adjusted  4. Patient will return to all functional activities without increased symptoms or restriction. [] Progressing: [] Met: [x] Not Met: [] Adjusted  5. Pt will reach behind back pain free.    [] Progressing: [] Met: [x] Not Met: [] Adjusted     Progression Towards Functional goals:  [] Patient is progressing as expected towards functional goals listed. [] Progression is slowed due to complexities listed. [] Progression has been slowed due to co-morbidities. [x] Plan just implemented, too soon to assess goals progression  [] Other:     ASSESSMENT: Session focused on continued progression of R shoulder and periscapular strength, stability, and motor control to address remaining strength deficits. Plan to continue PT for another 2-4 weeks to focus on remaining strength deficits and to ensure comfort with comprehensive HEP that patient can continue on her own upon discharge. No NT reported within treatment session      Treatment/Activity Tolerance:  [x] Patient tolerated treatment well [] Patient limited by fatique  [] Patient limited by pain  [] Patient limited by other medical complications  [] Other:     Overall Progression Towards Functional goals/ Treatment Progress Update:  [] Patient is progressing as expected towards functional goals listed. [] Progression is slowed due to complexities/Impairments listed. [] Progression has been slowed due to co-morbidities. [x] Plan just implemented, too soon to assess goals progression <30days   [] Goals require adjustment due to lack of progress  [] Patient is not progressing as expected and requires additional follow up with physician  [] Other    Prognosis for POC: [x] Good [] Fair  [] Poor    Patient requires continued skilled intervention: [x] Yes  [] No      PLAN: Progress periscapular strengthening while addressing cervical component of pain. Work towards D/C or refer out if NT persists     [x] Continue per plan of care [] Alter current plan (see comments)  [] Plan of care initiated [] Hold pending MD visit [] Discharge    Electronically signed by:  Sacha Camarillo, PT    Note: If patient does not return for scheduled/recommended follow up visits, this note will serve as a discharge from [facility] along with the most recent update on progress.

## 2021-01-12 ENCOUNTER — HOSPITAL ENCOUNTER (OUTPATIENT)
Dept: PHYSICAL THERAPY | Age: 46
Setting detail: THERAPIES SERIES
Discharge: HOME | End: 2021-01-12
Payer: COMMERCIAL

## 2021-01-12 PROCEDURE — 97110 THERAPEUTIC EXERCISES: CPT

## 2021-01-12 PROCEDURE — 97140 MANUAL THERAPY 1/> REGIONS: CPT

## 2021-01-12 NOTE — FLOWSHEET NOTE
[employer] [facility]    Physical Therapy Treatment Note/ Progress Report:     Date:  2021    Patient Name:  Aramis Haddad    :  1975  MRN: [id number]  Medical/Treatment Diagnosis Information:  Diagnosis: S43.431D (R labral tear)  Treatment Diagnosis: R shoulder pain  Insurance/Certification information:  PT Insurance Information: UMR[place]  Physician Information:  Referring Practitioner: Dr. Aguilera Pretty of care signed (Y/N):     Date of Patient follow up with Physician: Crispin Nguyen   Progress Report: []  Yes  [x]  No     Functional Scale: SPADI 71%     Date: 20    Date Range for reporting period:  Beginning:    Ending:      Progress report due (10 Rx/or 30 days whichever is less):     Recertification due (POC duration/ or 90 days whichever is less):  20    Visit # Insurance Allowable Auth Needed   11 [place] []Yes    [x]No     Pain level:  6-8/10     SUBJECTIVE: Improved cervical and shoulder symptoms. Pt reports his low back has been more painful as of recently.      OBJECTIVE:    Observation:    Test measurements:    · 2020: PROM R shoulder: IR: [id number]     RESTRICTIONS/PRECAUTIONS: known posterior labral tear- conservative treatment   Exercises/Interventions:   Therapeutic Ex Wt / Resistance Sets/sec Reps Notes   Supine D2 RED      Bent Over Scaption  0# 2 10  because D2 flex was painful   SL ER 3# 2 15 Cues for Retraction   SL abduction   Straight elbow 1# 3 10    Bent Over Row   extension 6# 2 12 Hi-Hi/Lo  2/2 LBP   Bent Over Horiz ABD  No money  blue 2 10    Band ER/IR   rows/ extensions blue 2 10    Band ER at 90 R 2 10     IR towel   15\" 5x    Manual Intervention 10'       Shld /GH Mobs       Manual cervical traction   5 min      Thoracic/Rib manipulation  3 min     CT MT/Mobs       PROM MT  10 min   ABD/IR    Rhythmic Stabs   15\" 3x     NMR re-education       BB black 30s  15s x3  x3 ER/IR  Flexion with good Retraction     Wall clocks Y 2 10    Water stick 2# 15s x4 Single UE                                    Therapeutic Exercise and NMR EXR  [x] (18888) Provided verbal/tactile cueing for activities related to strengthening, flexibility, endurance, ROM  for improvements in scapular, scapulothoracic and UE control with self care, reaching, carrying, lifting, house/yardwork, driving/computer work.    [] (10732) Provided verbal/tactile cueing for activities related to improving balance, coordination, kinesthetic sense, posture, motor skill, proprioception  to assist with  scapular, scapulothoracic and UE control with self care, reaching, carrying, lifting, house/yardwork, driving/computer work. Therapeutic Activities:    [] (39164 or 44484) Provided verbal/tactile cueing for activities related to improving balance, coordination, kinesthetic sense, posture, motor skill, proprioception and motor activation to allow for proper function of scapular, scapulothoracic and UE control with self care, carrying, lifting, driving/computer work.      Home Exercise Program:    [x] (43023) Reviewed/Progressed HEP activities related to strengthening, flexibility, endurance, ROM of scapular, scapulothoracic and UE control with self care, reaching, carrying, lifting, house/yardwork, driving/computer work  [] (06672) Reviewed/Progressed HEP activities related to improving balance, coordination, kinesthetic sense, posture, motor skill, proprioception of scapular, scapulothoracic and UE control with self care, reaching, carrying, lifting, house/yardwork, driving/computer work      Manual Treatments:  PROM / STM / Oscillations-Mobs:  G-I, II, III, IV (PA's, Inf., Post.)  [x] (47610) Provided manual therapy to mobilize soft tissue/joints of cervical/CT, scapular GHJ and UE for the purpose of modulating pain, promoting relaxation,  increasing ROM, reducing/eliminating soft tissue swelling/inflammation/restriction, improving soft tissue extensibility and allowing for proper ROM for normal function with self care, reaching, carrying, lifting, house/yardwork, driving/computer work    Modalities:      Charges:  Timed Code Treatment Minutes: 45 min    Total Treatment Minutes: 45 min       [] EVAL (LOW) 83452 (typically 20 minutes face-to-face)  [] EVAL (MOD) 14848 (typically 30 minutes face-to-face)  [] EVAL (HIGH) 62406 (typically 45 minutes face-to-face)  [] RE-EVAL     [x] KR(40581) x 1    [] IONTO (84712)  [] NMR (96110) x     [] VASO (67162)  [x] Manual (01397) x 2    [] Other:  [] TA (16646)x     [] Mech Traction (83072)  [] ES(attended) (17437)     [] ES (un) (04527):       GOALS:  Patient stated goal: \"no shoulder pain\"   [] Progressing: [] Met: [x] Not Met: [] Adjusted    Therapist goals for Patient:   Short Term Goals: To be achieved in: 2 weeks  1. Independent in HEP and progression per patient tolerance, in order to prevent re-injury. [] Progressing: [] Met: [x] Not Met: [] Adjusted  2. Patient will have a decrease in pain to facilitate improvement in movement, function, and ADLs as indicated by Functional Deficits. [] Progressing: [] Met: [x] Not Met: [] Adjusted    Long Term Goals: To be achieved in: 8 weeks  1. Disability index score of 15% or less for the DASH to assist with reaching prior level of function. [] Progressing: [] Met: [x] Not Met: [] Adjusted  2. Patient will demonstrate increased AROM to [facility] [place] to allow for proper joint functioning as indicated by patients Functional Deficits. [] Progressing: [] Met: [x] Not Met: [] Adjusted  3. Patient will demonstrate an increase in Strength to [facility] [place] to allow for proper functional mobility as indicated by patients Functional Deficits. [] Progressing: [] Met: [x] Not Met: [] Adjusted  4. Patient will return to all functional activities without increased symptoms or restriction. [] Progressing: [] Met: [x] Not Met: [] Adjusted  5. Pt will reach behind back pain free.    [] Progressing: [] Met: [x] Not Met: [] Adjusted     Progression Towards Functional goals:  [] Patient is progressing as expected towards functional goals listed. [] Progression is slowed due to complexities listed. [] Progression has been slowed due to co-morbidities. [x] Plan just implemented, too soon to assess goals progression  [] Other:     ASSESSMENT: Session focused on continued progression of R shoulder and periscapular strength, stability, and motor control to address remaining strength deficits. Plan to continue PT for another 2-4 weeks to focus on remaining strength deficits and to ensure comfort with comprehensive HEP that patient can continue on her own upon discharge. No NT reported within treatment session      Treatment/Activity Tolerance:  [x] Patient tolerated treatment well [] Patient limited by fatique  [] Patient limited by pain  [] Patient limited by other medical complications  [] Other:     Overall Progression Towards Functional goals/ Treatment Progress Update:  [] Patient is progressing as expected towards functional goals listed. [] Progression is slowed due to complexities/Impairments listed. [] Progression has been slowed due to co-morbidities. [x] Plan just implemented, too soon to assess goals progression <30days   [] Goals require adjustment due to lack of progress  [] Patient is not progressing as expected and requires additional follow up with physician  [] Other    Prognosis for POC: [x] Good [] Fair  [] Poor    Patient requires continued skilled intervention: [x] Yes  [] No      PLAN: Progress periscapular strengthening while addressing cervical component of pain. Work towards D/C    [x] Continue per plan of care [] Alter current plan (see comments)  [] Plan of care initiated [] Hold pending MD visit [] Discharge    Electronically signed by:  Sacha Camarillo, PT    Note: If patient does not return for scheduled/recommended follow up visits, this note will serve as a discharge from care along with the most recent update on progress.

## 2021-01-13 LAB
CALCULI COMPOSITION: NORMAL
MASS: 68 MG
STONE DESCRIPTION: NORMAL
STONE NUMBER: 1
STONE SIZE: NORMAL MM

## 2021-01-18 ENCOUNTER — TELEPHONE (OUTPATIENT)
Dept: FAMILY MEDICINE CLINIC | Age: 46
End: 2021-01-18

## 2021-01-18 NOTE — TELEPHONE ENCOUNTER
Calcium oxalate stone  Risk factors:  Dehydration from not drinking enough fluid A diet too high in:   Protein   Oxalate (nuts, peanuts, beets, rhubarb)  Sodium (salt)   Sugar (like high fructose corn syrup)  Do NOT restrict calcium in diet, but do avoid calcium supplements

## 2021-01-18 NOTE — TELEPHONE ENCOUNTER
Patient calling in to get the results of his labs for kidney stones which were drawn on 1/2/21. He was able to see that there was something resulted on the stone analysis, but there was a message on both the stone desciption and calculi composition that said \"see note\". Please call patient to advise.

## 2021-01-19 ENCOUNTER — HOSPITAL ENCOUNTER (OUTPATIENT)
Dept: PHYSICAL THERAPY | Age: 46
Setting detail: THERAPIES SERIES
Discharge: HOME | End: 2021-01-19
Payer: COMMERCIAL

## 2021-01-19 PROCEDURE — 97110 THERAPEUTIC EXERCISES: CPT

## 2021-01-19 PROCEDURE — 97140 MANUAL THERAPY 1/> REGIONS: CPT

## 2021-01-19 NOTE — FLOWSHEET NOTE
[facility]    Physical Therapy Treatment Note/ Progress Report:     Date:  2021    Patient Name:  Bao Nina    :  1975  MRN: [id number]  Medical/Treatment Diagnosis Information:  Diagnosis: S43.431D (R labral tear)  Treatment Diagnosis: R shoulder pain  Insurance/Certification information:  PT Insurance Information: UMR/[place]  Physician Information:  Referring Practitioner: Dr. Camargo Shall of care signed (Y/N):     Date of Patient follow up with Physician: Nelly Gimenez   Progress Report: []  Yes  [x]  No     Functional Scale: SPADI 71%     Date: 20    Date Range for reporting period:  Beginning:    Ending:      Progress report due (10 Rx/or 30 days whichever is less): 92    Recertification due (POC duration/ or 90 days whichever is less):  20    Visit # Insurance Allowable Auth Needed   11 [place] []Yes    [x]No     Pain level:  6-8/10     SUBJECTIVE: Improved cervical and shoulder symptoms. Pt reports his low back has been more painful as of recently.      OBJECTIVE:    Observation:    Test measurements:    · 2020: PROM R shoulder: IR: [email or web address]     RESTRICTIONS/PRECAUTIONS: known posterior labral tear- conservative treatment   Exercises/Interventions:   Therapeutic Ex Wt / Resistance Sets/sec Reps Notes   Supine D2 RED      Bent Over Scaption  0# 2 10  because D2 flex was painful   SL ER 3# 2 15 Cues for Retraction   SL abduction   Straight elbow 1# 3 10    Bent Over Row   extension 6# 2 12 Hi-Hi/Lo  2/2 LBP   Bent Over Horiz ABD  No money  blue 2 10    Band ER/IR   rows/ extensions blue 2 10    Band ER at 90 R 2 10     IR towel   15\" 5x    Manual Intervention 10'       Shld /GH Mobs       Manual cervical traction   5 min      Thoracic/Rib manipulation  3 min     CT MT/Mobs       PROM MT  10 min   ABD/IR    Rhythmic Stabs   15\" 3x     NMR re-education       BB black 30s  15s x3  x3 ER/IR  Flexion tissue extensibility and allowing for proper ROM for normal function with self care, reaching, carrying, lifting, house/yardwork, driving/computer work    Modalities:      Charges:  Timed Code Treatment Minutes: 45 min    Total Treatment Minutes: 45 min       [] EVAL (LOW) 37562 (typically 20 minutes face-to-face)  [] EVAL (MOD) 92369 (typically 30 minutes face-to-face)  [] EVAL (HIGH) 84914 (typically 45 minutes face-to-face)  [] RE-EVAL     [x] DD(05225) x 1    [] IONTO (25875)  [] NMR (16929) x     [] VASO (08627)  [x] Manual (99444) x 2    [] Other:  [] TA (63087)x     [] Mech Traction (32849)  [] ES(attended) (73275)     [] ES (un) (87445):       GOALS:  Patient stated goal: \"no shoulder pain\"   [] Progressing: [] Met: [x] Not Met: [] Adjusted    Therapist goals for Patient:   Short Term Goals: To be achieved in: 2 weeks  1. Independent in HEP and progression per patient tolerance, in order to prevent re-injury. [] Progressing: [] Met: [x] Not Met: [] Adjusted  2. Patient will have a decrease in pain to facilitate improvement in movement, function, and ADLs as indicated by Functional Deficits. [] Progressing: [] Met: [x] Not Met: [] Adjusted    Long Term Goals: To be achieved in: 8 weeks  1. Disability index score of 15% or less for the DASH to assist with reaching prior level of function. [] Progressing: [] Met: [x] Not Met: [] Adjusted  2. Patient will demonstrate increased AROM to [facility] [place] to allow for proper joint functioning as indicated by patients Functional Deficits. [] Progressing: [] Met: [x] Not Met: [] Adjusted  3. Patient will demonstrate an increase in Strength to [facility] [place] to allow for proper functional mobility as indicated by patients Functional Deficits. [] Progressing: [] Met: [x] Not Met: [] Adjusted  4. Patient will return to all functional activities without increased symptoms or restriction. [] Progressing: [] Met: [x] Not Met: [] Adjusted  5. Pt will reach behind back pain free.    [] discharge from care along with the most recent update on progress.

## 2021-01-21 ENCOUNTER — HOSPITAL ENCOUNTER (OUTPATIENT)
Dept: PHYSICAL THERAPY | Age: 46
Setting detail: THERAPIES SERIES
Discharge: HOME | End: 2021-01-21
Payer: COMMERCIAL

## 2021-01-21 PROCEDURE — 97140 MANUAL THERAPY 1/> REGIONS: CPT

## 2021-01-21 PROCEDURE — 97110 THERAPEUTIC EXERCISES: CPT

## 2021-01-21 NOTE — FLOWSHEET NOTE
[facility]    Physical Therapy Treatment Note/ Progress Report:     Date:  2021    Patient Name:  Edison Avelar    :  1975  MRN: [id number]  Medical/Treatment Diagnosis Information:  Diagnosis: S43.431D (R labral tear)  Treatment Diagnosis: R shoulder pain  Insurance/Certification information:  PT Insurance Information: UMR/[place]  Physician Information:  Referring Practitioner: Dr. Karissa Armenta of care signed (Y/N):     Date of Patient follow up with Physician: Chapo Singleton   Progress Report: []  Yes  [x]  No     Functional Scale: SPADI 71%     Date: 20    Date Range for reporting period:  Beginning:    Ending:      Progress report due (10 Rx/or 30 days whichever is less): 57    Recertification due (POC duration/ or 90 days whichever is less):  20    Visit # Insurance Allowable Auth Needed   12 [place] []Yes    [x]No     Pain level:  6-8/10     SUBJECTIVE: Has to try to make fingers numb, reports less symptoms as of lately.      OBJECTIVE:    Observation:    Test measurements:    · 2020: PROM R shoulder: IR: [email or web address]     RESTRICTIONS/PRECAUTIONS: known posterior labral tear- conservative treatment   Exercises/Interventions:   Therapeutic Ex Wt / Resistance Sets/sec Reps Notes   Supine D2 RED      Bent Over Scaption  0# 2 10  because D2 flex was painful   SL ER 3# 2 15 Cues for Retraction   SL abduction   Straight elbow 1# 3 10    Bent Over Row   extension 6# 2 12 Hi-Hi/Lo  2/2 LBP   Bent Over Horiz ABD  No money  blue 2 10    Band ER/IR   rows/ extensions blue 2 10    Band ER at 90 R 2 10     IR towel   15\" 5x    Manual Intervention 10'       Shld /GH Mobs       Manual cervical traction   5 min      Thoracic/Rib manipulation  3 min     CT MT/Mobs       PROM MT  10 min   ABD/IR    Rhythmic Stabs   15\" 3x     NMR re-education       BB black 30s  15s x3  x3 ER/IR  Flexion with good Retraction     Wall clocks Y 2 10    Water stick 2# 15s x4 Single UE                                    Therapeutic Exercise and NMR EXR  [x] (52413) Provided verbal/tactile cueing for activities related to strengthening, flexibility, endurance, ROM  for improvements in scapular, scapulothoracic and UE control with self care, reaching, carrying, lifting, house/yardwork, driving/computer work.    [] (31562) Provided verbal/tactile cueing for activities related to improving balance, coordination, kinesthetic sense, posture, motor skill, proprioception  to assist with  scapular, scapulothoracic and UE control with self care, reaching, carrying, lifting, house/yardwork, driving/computer work. Therapeutic Activities:    [] (64544 or 64117) Provided verbal/tactile cueing for activities related to improving balance, coordination, kinesthetic sense, posture, motor skill, proprioception and motor activation to allow for proper function of scapular, scapulothoracic and UE control with self care, carrying, lifting, driving/computer work.      Home Exercise Program:    [x] (91667) Reviewed/Progressed HEP activities related to strengthening, flexibility, endurance, ROM of scapular, scapulothoracic and UE control with self care, reaching, carrying, lifting, house/yardwork, driving/computer work  [] (17115) Reviewed/Progressed HEP activities related to improving balance, coordination, kinesthetic sense, posture, motor skill, proprioception of scapular, scapulothoracic and UE control with self care, reaching, carrying, lifting, house/yardwork, driving/computer work      Manual Treatments:  PROM / STM / Oscillations-Mobs:  G-I, II, III, IV (PA's, Inf., Post.)  [x] (24791) Provided manual therapy to mobilize soft tissue/joints of cervical/CT, scapular GHJ and UE for the purpose of modulating pain, promoting relaxation,  increasing ROM, reducing/eliminating soft tissue swelling/inflammation/restriction, improving soft tissue extensibility and allowing for proper ROM for normal function with self care, reaching, carrying, lifting, house/yardwork, driving/computer work    Modalities:      Charges:  Timed Code Treatment Minutes: 45 min    Total Treatment Minutes: 45 min       [] EVAL (LOW) 67070 (typically 20 minutes face-to-face)  [] EVAL (MOD) 09214 (typically 30 minutes face-to-face)  [] EVAL (HIGH) 71910 (typically 45 minutes face-to-face)  [] RE-EVAL     [x] DP(98942) x 1    [] IONTO (37175)  [] NMR (60070) x     [] VASO (54940)  [x] Manual (11277) x 2    [] Other:  [] TA (02762)x     [] Mech Traction (73444)  [] ES(attended) (81873)     [] ES (un) (66115):       GOALS:  Patient stated goal: \"no shoulder pain\"   [] Progressing: [] Met: [x] Not Met: [] Adjusted    Therapist goals for Patient:   Short Term Goals: To be achieved in: 2 weeks  1. Independent in HEP and progression per patient tolerance, in order to prevent re-injury. [] Progressing: [] Met: [x] Not Met: [] Adjusted  2. Patient will have a decrease in pain to facilitate improvement in movement, function, and ADLs as indicated by Functional Deficits. [] Progressing: [] Met: [x] Not Met: [] Adjusted    Long Term Goals: To be achieved in: 8 weeks  1. Disability index score of 15% or less for the DASH to assist with reaching prior level of function. [] Progressing: [] Met: [x] Not Met: [] Adjusted  2. Patient will demonstrate increased AROM to [facility] [email or web address] to allow for proper joint functioning as indicated by patients Functional Deficits. [] Progressing: [] Met: [x] Not Met: [] Adjusted  3. Patient will demonstrate an increase in Strength to [facility] [employer] [employer] to allow for proper functional mobility as indicated by patients Functional Deficits. [] Progressing: [] Met: [x] Not Met: [] Adjusted  4. Patient will return to all functional activities without increased symptoms or restriction. [] Progressing: [] Met: [x] Not Met: [] Adjusted  5. Pt will reach behind back pain free.    [] Progressing: [] Met: [x] Not Met: [] Adjusted     Progression Towards Functional goals:  [] Patient is progressing as expected towards functional goals listed. [] Progression is slowed due to complexities listed. [] Progression has been slowed due to co-morbidities. [x] Plan just implemented, too soon to assess goals progression  [] Other:     ASSESSMENT: Session focused on continued progression of R shoulder and periscapular strength, stability, and motor control to address remaining strength deficits. Plan to continue PT for another 2-4 weeks to focus on remaining strength deficits and to ensure comfort with comprehensive HEP that patient can continue on her own upon discharge. No NT reported within treatment session      Treatment/Activity Tolerance:  [x] Patient tolerated treatment well [] Patient limited by fatique  [] Patient limited by pain  [] Patient limited by other medical complications  [] Other:     Overall Progression Towards Functional goals/ Treatment Progress Update:  [] Patient is progressing as expected towards functional goals listed. [] Progression is slowed due to complexities/Impairments listed. [] Progression has been slowed due to co-morbidities. [x] Plan just implemented, too soon to assess goals progression <30days   [] Goals require adjustment due to lack of progress  [] Patient is not progressing as expected and requires additional follow up with physician  [] Other    Prognosis for POC: [x] Good [] Fair  [] Poor    Patient requires continued skilled intervention: [x] Yes  [] No      PLAN: Progress periscapular strengthening while addressing cervical component of pain. Work towards D/C    [x] Continue per plan of care [] Alter current plan (see comments)  [] Plan of care initiated [] Hold pending MD visit [] Discharge    Electronically signed by:  Sacha Camarillo, PT    Note: If patient does not return for scheduled/recommended follow up visits, this note will serve as a discharge from care along with the most recent update on progress.

## 2021-01-25 RX ORDER — COLCHICINE 0.6 MG/1
TABLET ORAL
Qty: 60 TABLET | Refills: 0 | Status: SHIPPED | OUTPATIENT
Start: 2021-01-25 | End: 2021-02-22

## 2021-01-26 ENCOUNTER — HOSPITAL ENCOUNTER (OUTPATIENT)
Dept: PHYSICAL THERAPY | Age: 46
Setting detail: THERAPIES SERIES
Discharge: HOME | End: 2021-01-26
Payer: COMMERCIAL

## 2021-01-26 PROCEDURE — 97110 THERAPEUTIC EXERCISES: CPT

## 2021-01-26 PROCEDURE — 97140 MANUAL THERAPY 1/> REGIONS: CPT

## 2021-01-26 NOTE — FLOWSHEET NOTE
[address] 37, [employer]    Physical Therapy Treatment Note/ Progress Report:     Date:  2021    Patient Name:  Kel Botello    :  1975  MRN: [id number]  Medical/Treatment Diagnosis Information:  Diagnosis: S43.431D (R labral tear)  Treatment Diagnosis: R shoulder pain  Insurance/Certification information:  PT Insurance Information: UMR/[place]  Physician Information:  Referring Practitioner: Dr. Trae Noland of care signed (Y/N):     Date of Patient follow up with Physician: Zoila Vu   Progress Report: []  Yes  [x]  No     Functional Scale: SPADI 71%     Date: 20    Date Range for reporting period:  Beginning:    Ending:      Progress report due (10 Rx/or 30 days whichever is less):     Recertification due (POC duration/ or 90 days whichever is less):  20    Visit # Insurance Allowable Auth Needed   12 [place] []Yes    [x]No     Pain level:  6-8/10     SUBJECTIVE: Continues to report improvement in radiating symptoms, notes only a couple times the last few days has he noticed anything down the arm.      OBJECTIVE:    Observation:    Test measurements:    · 2020: PROM R shoulder: IR: [email or web address]     RESTRICTIONS/PRECAUTIONS: known posterior labral tear- conservative treatment   Exercises/Interventions:   Therapeutic Ex Wt / Resistance Sets/sec Reps Notes   Supine D2 RED      Bent Over Scaption  0# 2 10  because D2 flex was painful   SL ER 3# 2 15 Cues for Retraction   SL abduction   Straight elbow 1# 3 10    Bent Over Row   extension 6# 2 12 Hi-Hi/Lo  2/2 LBP   Bent Over Horiz ABD  No money  blue 2 10    Band ER/IR   rows/ extensions blue 2 10    Band ER at 90 R 2 10     IR towel   15\" 5x    Manual Intervention 10'       Shld /GH Mobs       Manual cervical traction   5 min      Thoracic/Rib manipulation  3 min     CT MT/Mobs       PROM MT  10 min   ABD/IR    Rhythmic Stabs   15\" 3x     NMR re-education       BB black 30s  15s x3  x3 ER/IR  Flexion with good Retraction     Wall clocks Y 2 10    Water stick 2# 15s x4 Single UE                                    Therapeutic Exercise and NMR EXR  [x] (01278) Provided verbal/tactile cueing for activities related to strengthening, flexibility, endurance, ROM  for improvements in scapular, scapulothoracic and UE control with self care, reaching, carrying, lifting, house/yardwork, driving/computer work.    [] (14483) Provided verbal/tactile cueing for activities related to improving balance, coordination, kinesthetic sense, posture, motor skill, proprioception  to assist with  scapular, scapulothoracic and UE control with self care, reaching, carrying, lifting, house/yardwork, driving/computer work. Therapeutic Activities:    [] (24809 or 70303) Provided verbal/tactile cueing for activities related to improving balance, coordination, kinesthetic sense, posture, motor skill, proprioception and motor activation to allow for proper function of scapular, scapulothoracic and UE control with self care, carrying, lifting, driving/computer work.      Home Exercise Program:    [x] (15531) Reviewed/Progressed HEP activities related to strengthening, flexibility, endurance, ROM of scapular, scapulothoracic and UE control with self care, reaching, carrying, lifting, house/yardwork, driving/computer work  [] (15029) Reviewed/Progressed HEP activities related to improving balance, coordination, kinesthetic sense, posture, motor skill, proprioception of scapular, scapulothoracic and UE control with self care, reaching, carrying, lifting, house/yardwork, driving/computer work      Manual Treatments:  PROM / STM / Oscillations-Mobs:  G-I, II, III, IV (PA's, Inf., Post.)  [x] (68638) Provided manual therapy to mobilize soft tissue/joints of cervical/CT, scapular GHJ and UE for the purpose of modulating pain, promoting relaxation,  increasing ROM, reducing/eliminating soft tissue swelling/inflammation/restriction, improving soft tissue extensibility and allowing for proper ROM for normal function with self care, reaching, carrying, lifting, house/yardwork, driving/computer work    Modalities:      Charges:  Timed Code Treatment Minutes: 45 min    Total Treatment Minutes: 45 min       [] EVAL (LOW) 94496 (typically 20 minutes face-to-face)  [] EVAL (MOD) 85442 (typically 30 minutes face-to-face)  [] EVAL (HIGH) 21076 (typically 45 minutes face-to-face)  [] RE-EVAL     [x] WL(65725) x 1    [] IONTO (77908)  [] NMR (51847) x     [] VASO (33185)  [x] Manual (28973) x 2    [] Other:  [] TA (20271)x     [] Mech Traction (13038)  [] ES(attended) (85999)     [] ES (un) (07314):       GOALS:  Patient stated goal: \"no shoulder pain\"   [] Progressing: [] Met: [x] Not Met: [] Adjusted    Therapist goals for Patient:   Short Term Goals: To be achieved in: 2 weeks  1. Independent in HEP and progression per patient tolerance, in order to prevent re-injury. [] Progressing: [] Met: [x] Not Met: [] Adjusted  2. Patient will have a decrease in pain to facilitate improvement in movement, function, and ADLs as indicated by Functional Deficits. [] Progressing: [] Met: [x] Not Met: [] Adjusted    Long Term Goals: To be achieved in: 8 weeks  1. Disability index score of 15% or less for the DASH to assist with reaching prior level of function. [] Progressing: [] Met: [x] Not Met: [] Adjusted  2. Patient will demonstrate increased AROM to [facility] [place] to allow for proper joint functioning as indicated by patients Functional Deficits. [] Progressing: [] Met: [x] Not Met: [] Adjusted  3. Patient will demonstrate an increase in Strength to [facility] [place] to allow for proper functional mobility as indicated by patients Functional Deficits. [] Progressing: [] Met: [x] Not Met: [] Adjusted  4. Patient will return to all functional activities without increased symptoms or restriction.    [] Progressing: [] Met: [x] Not Met: [] Adjusted  5. Pt will reach behind back pain free. [] Progressing: [] Met: [x] Not Met: [] Adjusted     Progression Towards Functional goals:  [] Patient is progressing as expected towards functional goals listed. [] Progression is slowed due to complexities listed. [] Progression has been slowed due to co-morbidities. [x] Plan just implemented, too soon to assess goals progression  [] Other:     ASSESSMENT: Session focused on continued progression of R shoulder and periscapular strength, stability, and motor control to address remaining strength deficits. Plan to continue PT for another 2-4 weeks to focus on remaining strength deficits and to ensure comfort with comprehensive HEP that patient can continue on her own upon discharge. No NT reported within treatment session      Treatment/Activity Tolerance:  [x] Patient tolerated treatment well [] Patient limited by fatique  [] Patient limited by pain  [] Patient limited by other medical complications  [] Other:     Overall Progression Towards Functional goals/ Treatment Progress Update:  [] Patient is progressing as expected towards functional goals listed. [] Progression is slowed due to complexities/Impairments listed. [] Progression has been slowed due to co-morbidities. [x] Plan just implemented, too soon to assess goals progression <30days   [] Goals require adjustment due to lack of progress  [] Patient is not progressing as expected and requires additional follow up with physician  [] Other    Prognosis for POC: [x] Good [] Fair  [] Poor    Patient requires continued skilled intervention: [x] Yes  [] No      PLAN: Progress periscapular strengthening while addressing cervical component of pain. Work towards D/C    [x] Continue per plan of care [] Alter current plan (see comments)  [] Plan of care initiated [] Hold pending MD visit [] Discharge    Electronically signed by:  Sacha Camarillo, PT    Note: If patient does not return for scheduled/recommended follow up visits, this note will serve as a discharge from care along with the most recent update on progress.

## 2021-01-27 ENCOUNTER — HOSPITAL ENCOUNTER (OUTPATIENT)
Dept: PHYSICAL THERAPY | Age: 46
Setting detail: THERAPIES SERIES
End: 2021-01-27
Payer: COMMERCIAL

## 2021-02-02 ENCOUNTER — HOSPITAL ENCOUNTER (OUTPATIENT)
Dept: PHYSICAL THERAPY | Age: 46
Setting detail: THERAPIES SERIES
End: 2021-02-02
Payer: COMMERCIAL

## 2021-02-03 ENCOUNTER — HOSPITAL ENCOUNTER (OUTPATIENT)
Dept: PHYSICAL THERAPY | Age: 46
Setting detail: THERAPIES SERIES
Discharge: HOME | End: 2021-02-03
Payer: COMMERCIAL

## 2021-02-03 PROCEDURE — 97140 MANUAL THERAPY 1/> REGIONS: CPT

## 2021-02-03 PROCEDURE — 97110 THERAPEUTIC EXERCISES: CPT

## 2021-02-03 NOTE — FLOWSHEET NOTE
[facility]    [facility] Treatment Note/ Progress Report:     Date:  2/3/2021    Patient Name:  Patrica Fraser    :  1975  MRN: [id number]  Medical/Treatment Diagnosis Information:  Diagnosis: S43.431D (R labral tear)  Treatment Diagnosis: R shoulder pain  Insurance/Certification information:  PT Insurance Information: UMR/[place]OP [place]  Physician Information:  Referring Practitioner: Dr. Flavia Smith of care signed (Y/N):     Date of Patient follow up with Physician: Orlin Mcintosh   Progress Report: []  Yes  [x]  No     Functional Scale: SPADI 71%     Date: 20    Date Range for reporting period:  Beginning:    Ending:      Progress report due (10 Rx/or 30 days whichever is less):     Recertification due (POC duration/ or 90 days whichever is less):  20    Visit # Insurance Allowable Auth Needed   12 [place] []Yes    [x]No     Pain level:  6-8/10     SUBJECTIVE: Increase in low back pain with increased NT in hand.       OBJECTIVE:    Observation:    Test measurements:    · 2020: PROM R shoulder: IR: [email or web address]     RESTRICTIONS/PRECAUTIONS: known posterior labral tear- conservative treatment   Exercises/Interventions:   Therapeutic Ex Wt / Resistance Sets/sec Reps Notes   Supine D2 RED      Bent Over Scaption  0# 2 10  because D2 flex was painful   SL ER 3# 2 15 Cues for Retraction   SL abduction   Straight elbow 1# 3 10    Bent Over Row   extension 6# 2 12 Hi-Hi/Lo  2/2 LBP   Bent Over Horiz ABD  No money  blue 2 10    Band ER/IR   rows/ extensions blue 2 10    Band ER at 90 R 2 10     IR towel   15\" 5x    Manual Intervention 10'       Shld /GH Mobs       Manual cervical traction   5 min      Thoracic/Rib manipulation  3 min     CT MT/Mobs       PROM MT  10 min   ABD/IR    Rhythmic Stabs   15\" 3x     NMR re-education       BB black 30s  15s x3  x3 ER/IR  Flexion with good Retraction     Wall clocks Y 2 10 Water stick 2# 15s x4 Single UE                                    Therapeutic Exercise and NMR EXR  [x] (10188) Provided verbal/tactile cueing for activities related to strengthening, flexibility, endurance, ROM  for improvements in scapular, scapulothoracic and UE control with self care, reaching, carrying, lifting, house/yardwork, driving/computer work.    [] (66117) Provided verbal/tactile cueing for activities related to improving balance, coordination, kinesthetic sense, posture, motor skill, proprioception  to assist with  scapular, scapulothoracic and UE control with self care, reaching, carrying, lifting, house/yardwork, driving/computer work. Therapeutic Activities:    [] (46305 or 85938) Provided verbal/tactile cueing for activities related to improving balance, coordination, kinesthetic sense, posture, motor skill, proprioception and motor activation to allow for proper function of scapular, scapulothoracic and UE control with self care, carrying, lifting, driving/computer work.      Home Exercise Program:    [x] (21900) Reviewed/Progressed HEP activities related to strengthening, flexibility, endurance, ROM of scapular, scapulothoracic and UE control with self care, reaching, carrying, lifting, house/yardwork, driving/computer work  [] (08933) Reviewed/Progressed HEP activities related to improving balance, coordination, kinesthetic sense, posture, motor skill, proprioception of scapular, scapulothoracic and UE control with self care, reaching, carrying, lifting, house/yardwork, driving/computer work      Manual Treatments:  PROM / STM / Oscillations-Mobs:  G-I, II, III, IV (PA's, Inf., Post.)  [x] (43895) Provided manual therapy to mobilize soft tissue/joints of cervical/CT, scapular GHJ and UE for the purpose of modulating pain, promoting relaxation,  increasing ROM, reducing/eliminating soft tissue swelling/inflammation/restriction, improving soft tissue extensibility and allowing for proper ROM for normal function with self care, reaching, carrying, lifting, house/yardwork, driving/computer work    Modalities:      Charges:  Timed Code Treatment Minutes: 45 min    Total Treatment Minutes: 45 min       [] EVAL (LOW) 94173 (typically 20 minutes face-to-face)  [] EVAL (MOD) 49794 (typically 30 minutes face-to-face)  [] EVAL (HIGH) 46097 (typically 45 minutes face-to-face)  [] RE-EVAL     [x] QX(11572) x 1    [] IONTO (32011)  [] NMR (52679) x     [] VASO (71044)  [x] Manual (49347) x 2    [] Other:  [] TA (46944)x     [] Mech Traction (85639)  [] ES(attended) (04595)     [] ES (un) (11115):       GOALS:  Patient stated goal: \"no shoulder pain\"   [] Progressing: [] Met: [x] Not Met: [] Adjusted    Therapist goals for Patient:   Short Term Goals: To be achieved in: 2 weeks  1. Independent in HEP and progression per patient tolerance, in order to prevent re-injury. [] Progressing: [] Met: [x] Not Met: [] Adjusted  2. Patient will have a decrease in pain to facilitate improvement in movement, function, and ADLs as indicated by Functional Deficits. [] Progressing: [] Met: [x] Not Met: [] Adjusted    Long Term Goals: To be achieved in: 8 weeks  1. Disability index score of 15% or less for the DASH to assist with reaching prior level of function. [] Progressing: [] Met: [x] Not Met: [] Adjusted  2. Patient will demonstrate increased AROM to [facility] [employer] to allow for proper joint functioning as indicated by patients Functional Deficits. [] Progressing: [] Met: [x] Not Met: [] Adjusted  3. Patient will demonstrate an increase in Strength to [facility] [employer] to allow for proper functional mobility as indicated by patients Functional Deficits. [] Progressing: [] Met: [x] Not Met: [] Adjusted  4. Patient will return to all functional activities without increased symptoms or restriction. [] Progressing: [] Met: [x] Not Met: [] Adjusted  5. Pt will reach behind back pain free.    [] Progressing: [] Met: [x] Not Met: [] Adjusted     Progression Towards Functional goals:  [] Patient is progressing as expected towards functional goals listed. [] Progression is slowed due to complexities listed. [] Progression has been slowed due to co-morbidities. [x] Plan just implemented, too soon to assess goals progression  [] Other:     ASSESSMENT: Session focused on continued progression of R shoulder and periscapular strength, stability, and motor control to address remaining strength deficits. Plan to continue PT for another 2-4 weeks to focus on remaining strength deficits and to ensure comfort with comprehensive HEP that patient can continue on her own upon discharge. No NT reported within treatment session      Treatment/Activity Tolerance:  [x] Patient tolerated treatment well [] Patient limited by fatique  [] Patient limited by pain  [] Patient limited by other medical complications  [] Other:     Overall Progression Towards Functional goals/ Treatment Progress Update:  [] Patient is progressing as expected towards functional goals listed. [] Progression is slowed due to complexities/Impairments listed. [] Progression has been slowed due to co-morbidities. [x] Plan just implemented, too soon to assess goals progression <30days   [] Goals require adjustment due to lack of progress  [] Patient is not progressing as expected and requires additional follow up with physician  [] Other    Prognosis for POC: [x] Good [] Fair  [] Poor    Patient requires continued skilled intervention: [x] Yes  [] No      PLAN: Progress periscapular strengthening while addressing cervical component of pain. Refer back to physician d/t lack of progress    [x] Continue per plan of care [] Alter current plan (see comments)  [] Plan of care initiated [] Hold pending MD visit [] Discharge    Electronically signed by:  Sacha Camarillo, PT    Note: If patient does not return for scheduled/recommended follow up visits, this note will serve as a discharge from [facility] along with the most recent update on progress.

## 2021-02-10 ENCOUNTER — HOSPITAL ENCOUNTER (OUTPATIENT)
Dept: PHYSICAL THERAPY | Age: 46
Setting detail: THERAPIES SERIES
Discharge: HOME | End: 2021-02-10
Payer: COMMERCIAL

## 2021-02-22 DIAGNOSIS — K21.9 GASTROESOPHAGEAL REFLUX DISEASE: ICD-10-CM

## 2021-02-22 RX ORDER — COLCHICINE 0.6 MG/1
TABLET ORAL
Qty: 60 TABLET | Refills: 0 | Status: SHIPPED | OUTPATIENT
Start: 2021-02-22 | End: 2021-05-18

## 2021-02-22 RX ORDER — PANTOPRAZOLE SODIUM 40 MG/1
40 TABLET, DELAYED RELEASE ORAL DAILY
Qty: 30 TABLET | Refills: 3 | Status: SHIPPED | OUTPATIENT
Start: 2021-02-22 | End: 2021-04-08

## 2021-02-24 ENCOUNTER — HOSPITAL ENCOUNTER (OUTPATIENT)
Dept: PHYSICAL THERAPY | Age: 46
Setting detail: THERAPIES SERIES
Discharge: HOME | End: 2021-02-24
Payer: COMMERCIAL

## 2021-02-24 PROCEDURE — 97140 MANUAL THERAPY 1/> REGIONS: CPT

## 2021-02-24 PROCEDURE — 97110 THERAPEUTIC EXERCISES: CPT

## 2021-02-24 PROCEDURE — 97112 NEUROMUSCULAR REEDUCATION: CPT

## 2021-02-24 NOTE — FLOWSHEET NOTE
Gregory 37, [facility]    Physical Therapy Treatment Note/ Progress Report:     Date:  2021    Patient Name:  Vasile Mensah    :  1975  MRN: [id number]  Medical/Treatment Diagnosis Information:  Diagnosis: S43.431D (R labral tear)  Treatment Diagnosis: R shoulder pain  Insurance/Certification information:  PT Insurance Information: UMR/[place]  Physician Information:  Referring Practitioner: Dr. Rosendo Polanco of care signed (Y/N):     Date of Patient follow up with Physician: Elvie Camargo   Progress Report: []  Yes  [x]  No     Functional Scale: SPADI 71%     Date: 20    Date Range for reporting period:  Beginning:    Ending:      Progress report due (10 Rx/or 30 days whichever is less): 79/3/41    Recertification due (POC duration/ or 90 days whichever is less):  20    Visit # Insurance Allowable Auth Needed   12 [place] []Yes    [x]No     Pain level:  6-8/10     SUBJECTIVE: Increase in low back pain with increased NT in hand.       OBJECTIVE:    Observation:    Test measurements:    · 2020: PROM R shoulder: IR: [email or web address]     RESTRICTIONS/PRECAUTIONS: known posterior labral tear- conservative treatment   Exercises/Interventions:   Therapeutic Ex Wt / Resistance Sets/sec Reps Notes   Supine D2 RED      Bent Over Scaption  0# 2 10  because D2 flex was painful   SL ER 3# 2 15 Cues for Retraction   SL abduction   Straight elbow 1# 3 10    Bent Over Row   extension 6# 2 12 Hi-Hi/Lo  2/2 LBP   Bent Over Horiz ABD  No money  blue 2 10    Band ER/IR   rows/ extensions blue 2 10    Band ER at 90 R 2 10     IR towel   15\" 5x    Manual Intervention 10'       Shld /GH Mobs       Manual cervical traction   5 min      Thoracic/Rib manipulation  3 min     CT MT/Mobs       PROM MT  10 min   ABD/IR    Rhythmic Stabs   15\" 3x     NMR re-education       BB black 30s  15s x3  x3 ER/IR  Flexion with good Retraction     Wall clocks Y 2 10 Water stick 2# 15s x4 Single UE                                    Therapeutic Exercise and NMR EXR  [x] (08581) Provided verbal/tactile cueing for activities related to strengthening, flexibility, endurance, ROM  for improvements in scapular, scapulothoracic and UE control with self care, reaching, carrying, lifting, house/yardwork, driving/computer work.    [] (86781) Provided verbal/tactile cueing for activities related to improving balance, coordination, kinesthetic sense, posture, motor skill, proprioception  to assist with  scapular, scapulothoracic and UE control with self care, reaching, carrying, lifting, house/yardwork, driving/computer work. Therapeutic Activities:    [] (16309 or 09090) Provided verbal/tactile cueing for activities related to improving balance, coordination, kinesthetic sense, posture, motor skill, proprioception and motor activation to allow for proper function of scapular, scapulothoracic and UE control with self care, carrying, lifting, driving/computer work.      Home Exercise Program:    [x] (79223) Reviewed/Progressed HEP activities related to strengthening, flexibility, endurance, ROM of scapular, scapulothoracic and UE control with self care, reaching, carrying, lifting, house/yardwork, driving/computer work  [] (04199) Reviewed/Progressed HEP activities related to improving balance, coordination, kinesthetic sense, posture, motor skill, proprioception of scapular, scapulothoracic and UE control with self care, reaching, carrying, lifting, house/yardwork, driving/computer work      Manual Treatments:  PROM / STM / Oscillations-Mobs:  G-I, II, III, IV (PA's, Inf., Post.)  [x] (02460) Provided manual therapy to mobilize soft tissue/joints of cervical/CT, scapular GHJ and UE for the purpose of modulating pain, promoting relaxation,  increasing ROM, reducing/eliminating soft tissue swelling/inflammation/restriction, improving soft tissue extensibility and allowing for proper ROM for normal function with self care, reaching, carrying, lifting, house/yardwork, driving/computer work    Modalities:      Charges:  Timed Code Treatment Minutes: 45 min    Total Treatment Minutes: 45 min       [] EVAL (LOW) 67040 (typically 20 minutes face-to-face)  [] EVAL (MOD) 80352 (typically 30 minutes face-to-face)  [] EVAL (HIGH) 36539 (typically 45 minutes face-to-face)  [] RE-EVAL     [x] GT(00602) x 1    [] IONTO (40773)  [] NMR (71914) x     [] VASO (02616)  [x] Manual (63332) x 2    [] Other:  [] TA (46210)x     [] Mech Traction (48184)  [] ES(attended) (90993)     [] ES (un) (55587):       GOALS:  Patient stated goal: \"no shoulder pain\"   [] Progressing: [] Met: [x] Not Met: [] Adjusted    Therapist goals for Patient:   Short Term Goals: To be achieved in: 2 weeks  1. Independent in HEP and progression per patient tolerance, in order to prevent re-injury. [] Progressing: [] Met: [x] Not Met: [] Adjusted  2. Patient will have a decrease in pain to facilitate improvement in movement, function, and ADLs as indicated by Functional Deficits. [] Progressing: [] Met: [x] Not Met: [] Adjusted    Long Term Goals: To be achieved in: 8 weeks  1. Disability index score of 15% or less for the DASH to assist with reaching prior level of function. [] Progressing: [] Met: [x] Not Met: [] Adjusted  2. Patient will demonstrate increased AROM to [facility] [place] to allow for proper joint functioning as indicated by patients Functional Deficits. [] Progressing: [] Met: [x] Not Met: [] Adjusted  3. Patient will demonstrate an increase in Strength to [facility] [employer] [place] [place] [employer] to allow for proper functional mobility as indicated by patients Functional Deficits. [] Progressing: [] Met: [x] Not Met: [] Adjusted  4. Patient will return to all functional activities without increased symptoms or restriction. [] Progressing: [] Met: [x] Not Met: [] Adjusted  5. Pt will reach behind back pain free.    [] Progressing: [] Met: [x] Not Met: [] Adjusted     Progression Towards Functional goals:  [] Patient is progressing as expected towards functional goals listed. [] Progression is slowed due to complexities listed. [] Progression has been slowed due to co-morbidities. [x] Plan just implemented, too soon to assess goals progression  [] Other:     ASSESSMENT: Session focused on continued progression of R shoulder and periscapular strength, stability, and motor control to address remaining strength deficits. Plan to continue PT for another 2-4 weeks to focus on remaining strength deficits and to ensure comfort with comprehensive HEP that patient can continue on her own upon discharge. No NT reported within treatment session      Treatment/Activity Tolerance:  [x] Patient tolerated treatment well [] Patient limited by fatique  [] Patient limited by pain  [] Patient limited by other medical complications  [] Other:     Overall Progression Towards Functional goals/ Treatment Progress Update:  [] Patient is progressing as expected towards functional goals listed. [] Progression is slowed due to complexities/Impairments listed. [] Progression has been slowed due to co-morbidities. [x] Plan just implemented, too soon to assess goals progression <30days   [] Goals require adjustment due to lack of progress  [] Patient is not progressing as expected and requires additional follow up with physician  [] Other    Prognosis for POC: [x] Good [] Fair  [] Poor    Patient requires continued skilled intervention: [x] Yes  [] No      PLAN: Progress periscapular strengthening while addressing cervical component of pain. Refer back to physician d/t lack of progress    [x] Continue per plan of care [] Alter current plan (see comments)  [] Plan of care initiated [] Hold pending MD visit [] Discharge    Electronically signed by:  Sacha Camarillo, PT    Note: If patient does not return for scheduled/recommended follow up visits, this note will serve as a discharge from [facility] along with the most recent update on progress.

## 2021-04-08 ENCOUNTER — OFFICE VISIT (OUTPATIENT)
Dept: FAMILY MEDICINE CLINIC | Age: 46
End: 2021-04-08
Payer: COMMERCIAL

## 2021-04-08 VITALS
BODY MASS INDEX: 28.88 KG/M2 | HEIGHT: 67 IN | WEIGHT: 184 LBS | SYSTOLIC BLOOD PRESSURE: 118 MMHG | OXYGEN SATURATION: 99 % | DIASTOLIC BLOOD PRESSURE: 78 MMHG | HEART RATE: 68 BPM

## 2021-04-08 DIAGNOSIS — Z00.00 WELL ADULT EXAM: ICD-10-CM

## 2021-04-08 DIAGNOSIS — R53.83 FATIGUE, UNSPECIFIED TYPE: ICD-10-CM

## 2021-04-08 DIAGNOSIS — M50.30 DISC DISEASE, DEGENERATIVE, CERVICAL: ICD-10-CM

## 2021-04-08 DIAGNOSIS — G47.33 OBSTRUCTIVE SLEEP APNEA (ADULT) (PEDIATRIC): ICD-10-CM

## 2021-04-08 DIAGNOSIS — R73.03 PREDIABETES: ICD-10-CM

## 2021-04-08 DIAGNOSIS — Z00.00 WELL ADULT EXAM: Primary | ICD-10-CM

## 2021-04-08 DIAGNOSIS — M51.36 DISC DEGENERATION, LUMBAR: ICD-10-CM

## 2021-04-08 LAB
A/G RATIO: 2.5 (ref 1.1–2.2)
ALBUMIN SERPL-MCNC: 5 G/DL (ref 3.4–5)
ALP BLD-CCNC: 77 U/L (ref 40–129)
ALT SERPL-CCNC: 20 U/L (ref 10–40)
ANION GAP SERPL CALCULATED.3IONS-SCNC: 9 MMOL/L (ref 3–16)
AST SERPL-CCNC: 22 U/L (ref 15–37)
BILIRUB SERPL-MCNC: 0.5 MG/DL (ref 0–1)
BUN BLDV-MCNC: 12 MG/DL (ref 7–20)
CALCIUM SERPL-MCNC: 10 MG/DL (ref 8.3–10.6)
CHLORIDE BLD-SCNC: 104 MMOL/L (ref 99–110)
CHOLESTEROL, TOTAL: 205 MG/DL (ref 0–199)
CO2: 30 MMOL/L (ref 21–32)
CREAT SERPL-MCNC: 0.9 MG/DL (ref 0.9–1.3)
ESTIMATED AVERAGE GLUCOSE: 114 MG/DL
GFR AFRICAN AMERICAN: >60
GFR NON-AFRICAN AMERICAN: >60
GLOBULIN: 2 G/DL
GLUCOSE BLD-MCNC: 105 MG/DL (ref 70–99)
HBA1C MFR BLD: 5.6 %
HDLC SERPL-MCNC: 47 MG/DL (ref 40–60)
LDL CHOLESTEROL CALCULATED: 142 MG/DL
POTASSIUM SERPL-SCNC: 4.1 MMOL/L (ref 3.5–5.1)
SODIUM BLD-SCNC: 143 MMOL/L (ref 136–145)
TOTAL PROTEIN: 7 G/DL (ref 6.4–8.2)
TRIGL SERPL-MCNC: 81 MG/DL (ref 0–150)
VLDLC SERPL CALC-MCNC: 16 MG/DL

## 2021-04-08 PROCEDURE — 99396 PREV VISIT EST AGE 40-64: CPT | Performed by: FAMILY MEDICINE

## 2021-04-08 ASSESSMENT — PATIENT HEALTH QUESTIONNAIRE - PHQ9
SUM OF ALL RESPONSES TO PHQ QUESTIONS 1-9: 0
1. LITTLE INTEREST OR PLEASURE IN DOING THINGS: 0

## 2021-04-08 NOTE — PROGRESS NOTES
2021    Dony Patino (:  1975) is a 54 y.o. male, here for a preventive medicine evaluation. Patient Active Problem List   Diagnosis    Disc degeneration, lumbar    GERD with esophagitis    Mixed hyperlipidemia    ADD (attention deficit disorder)    Laryngitis    Obstructive sleep apnea (adult) (pediatric)    Restless legs syndrome (RLS)    Snoring     Pt is a of 54 y.o. male comes in today with   Chief Complaint   Patient presents with    Annual Exam     Fasting. Compliant with cpap but still has fatigue. Has MSLT scheduled. Wakes up frequently. cbd oil helps a little. Failed multiple meds or had side effects. Has been able to lose a little wt. Watching diet. No lasting relief with PT for the back. Some relief with chiropractic. Failed NAVEED in the lumbar spine. Some relief with cervical NAVEED 1/3 times. Having radiculopathy now. Vitals:    21 0925   BP: 118/78   Site: Left Upper Arm   Position: Sitting   Cuff Size: Medium Adult   Pulse: 68   SpO2: 99%   Weight: 184 lb (83.5 kg)   Height: 5' 7\" (1.702 m)      Review of Systems    Prior to Visit Medications    Medication Sig Taking?  Authorizing Provider   colchicine (COLCRYS) 0.6 MG tablet TAKE 1 TABLET BY MOUTH TWICE DAILY AS NEEDED FOR GOUT Yes Marvin Henderson MD   UNKNOWN TO PATIENT Indications: CBD OIL  Yes Historical Provider, MD   pantoprazole (PROTONIX) 40 MG tablet TAKE 1 TABLET BY MOUTH DAILY  Patient not taking: Reported on 2021  Marvin Henderson MD   tamsulosin [facility]) 0.4 MG capsule Take 1 capsule by mouth daily  Patient not taking: Reported on 2021  Marvin Henderson MD   tiZANidine (ZANAFLEX) 2 MG tablet Take 1-2 tablets by mouth every 8 hours as needed (muscle spasm)  Patient not taking: Reported on 2020  ANA CRISTINA Merlos - CNP        No Known Allergies    Past Medical History:   Diagnosis Date    Chronic back pain     Frequent urination     GERD (gastroesophageal Diabetes Mother     Cancer Father 66        esophageal    Diabetes Father     Cancer Maternal Grandmother     Cancer Maternal Grandfather        ADVANCE DIRECTIVE: N, <no information>    Vitals:    04/08/21 0925   BP: 118/78   Site: Left Upper Arm   Position: Sitting   Cuff Size: Medium Adult   Pulse: 68   SpO2: 99%   Weight: 184 lb (83.5 kg)   Height: 5' 7\" (1.702 m)     Estimated body mass index is 28.82 kg/m² as calculated from the following:    Height as of this encounter: 5' 7\" (1.702 m). Weight as of this encounter: 184 lb (83.5 kg). Physical Exam  Constitutional:       Appearance: He is well-developed. HENT:      Head: Normocephalic and atraumatic. Right Ear: Tympanic membrane, ear canal and external ear normal.      Left Ear: Tympanic membrane, ear canal and external ear normal.   Eyes:      General: No scleral icterus. Conjunctiva/sclera: Conjunctivae normal.   Neck:      Musculoskeletal: Normal range of motion and neck supple. Thyroid: No thyromegaly. Cardiovascular:      Rate and Rhythm: Normal rate and regular rhythm. Heart sounds: Normal heart sounds. No murmur. Pulmonary:      Effort: Pulmonary effort is normal.      Breath sounds: Normal breath sounds. No wheezing or rales. Abdominal:      General: Bowel sounds are normal. There is no distension. Palpations: Abdomen is soft. There is no hepatomegaly or splenomegaly. Tenderness: There is no abdominal tenderness. Skin:     General: Skin is warm and dry. Neurological:      Mental Status: He is alert and oriented to person, place, and time. Cranial Nerves: No cranial nerve deficit. Deep Tendon Reflexes: Reflexes are normal and symmetric. Psychiatric:         Behavior: Behavior normal.         Thought Content: Thought content normal.         Judgment: Judgment normal.         No flowsheet data found.     Lab Results   Component Value Date    CHOL 268 02/17/2020    CHOL 226 02/14/2019    CHOL 187 02/22/2018    TRIG 526 02/17/2020    TRIG 124 02/14/2019    TRIG 72 02/22/2018    HDL 38 02/17/2020    HDL 56 02/14/2019    HDL 56 02/22/2018    LDLCALC see below 02/17/2020    LDLCALC 145 02/14/2019    LDLCALC 117 02/22/2018    GLUCOSE 131 02/17/2020    LABA1C 6.0 02/17/2020    LABA1C 5.7 02/14/2019    LABA1C 5.5 02/22/2018       The 10-year ASCVD risk score (Isatu Noel, et al., 2013) is: 4.4%    Values used to calculate the score:      Age: 54 years      Sex: Male      Is Non- : No      Diabetic: No      Tobacco smoker: No      Systolic Blood Pressure: 033 mmHg      Is BP treated: No      HDL Cholesterol: 38 mg/dL      Total Cholesterol: 268 mg/dL      There is no immunization history on file for this patient. Health Maintenance   Topic Date Due    Hepatitis C screen  Never done    COVID-19 Vaccine (1) Never done    DTaP/Tdap/Td vaccine (1 - Tdap) Never done    A1C test (Diabetic or Prediabetic)  02/17/2021    Flu vaccine (Season Ended) 09/01/2021    Lipid screen  02/17/2025    HIV screen  Completed    Hepatitis A vaccine  Aged Out    Hepatitis B vaccine  Aged Out    Hib vaccine  Aged Out    Meningococcal (ACWY) vaccine  Aged Out    Pneumococcal 0-64 years Vaccine  Aged Out       ASSESSMENT/PLAN:  Louann Velasco was seen today for annual exam.    Diagnoses and all orders for this visit:    Well adult exam  -     Lipid Panel; Future  -     Comprehensive Metabolic Panel; Future  -     Testosterone, free, total; Future    Obstructive sleep apnea (adult) (pediatric)  Stable on cpap  Fatigue, unspecified type  -     Testosterone, free, total; Future  Checking testosterone. Cbc and tsh previously normal  Disc degeneration, lumbar, Disc disease, degenerative, cervical  -     [facility]surgery, [place]  Not well controlled. Referred for further evaluation    Prediabetes  -     Hemoglobin A1C; Future       No follow-ups on file.     An electronic signature was used to authenticate this note.     --Héctor Lazo MD on 4/8/2021 at 9:30 AM

## 2021-04-09 LAB
SEX HORMONE BINDING GLOBULIN: 27 NMOL/L (ref 11–80)
TESTOSTERONE FREE-NONMALE: 79.1 PG/ML (ref 47–244)
TESTOSTERONE TOTAL: 353 NG/DL (ref 220–1000)

## 2021-04-16 ENCOUNTER — TELEPHONE (OUTPATIENT)
Dept: FAMILY MEDICINE CLINIC | Age: 46
End: 2021-04-16

## 2021-05-18 RX ORDER — COLCHICINE 0.6 MG/1
TABLET ORAL
Qty: 60 TABLET | Refills: 0 | Status: SHIPPED | OUTPATIENT
Start: 2021-05-18

## 2021-07-21 NOTE — TELEPHONE ENCOUNTER
Ok to stop. His plan according to his note if this didn't work he wanted to try [place]. I am ok to start. He needs to make sure he is complaint with his cpap machine. Thank you! Quality 50: Urinary Incontinence: Plan Of Care For Urinary Incontinence In Women Aged 65 Years And Older: Urinary incontinence plan of care not documented, reason not otherwise specified Quality 397: Melanoma: Reporting: The pathology report includes a pT Category and statement on thickness and ulceration for pT1, mitotic rate. Quality 265: Biopsy Follow-Up: Biopsy results reviewed, communicated, tracked, and documented Quality 226: Preventive Care And Screening: Tobacco Use: Screening And Cessation Intervention: Patient screened for tobacco use and is an ex/non-smoker Quality 154 Part A: Falls: Risk Assessment (Should Be Reported With Measure 155.): Falls risk assessment completed and documented in the past 12 months. Quality 474: Zoster Vaccination Status: Shingrix Vaccination not Administered or Previously Received, Reason not Otherwise Specified Quality 48: Urinary Incontinence: Assessment Of Presence Or Absence Of Urinary Incontinence In Women Aged 65 Years And Older: Presence or absence of urinary incontinence not assessed, reason not otherwise specified Quality 111:Pneumonia Vaccination Status For Older Adults: Pneumococcal Vaccination Previously Received Quality 137: Melanoma: Continuity Of Care - Recall System: Patient information entered into a recall system that includes: target date for the next exam specified AND a process to follow up with patients regarding missed or unscheduled appointments Quality 130: Documentation Of Current Medications In The Medical Record: Current Medications Documented Quality 138: Melanoma: Coordination Of Care: A treatment plan was communicated to the physicians providing continuing care within one month of diagnosis outlining: diagnosis, tumor thickness and a plan for surgery or alternate care. Quality 110: Preventive Care And Screening: Influenza Immunization: Influenza Immunization previously received during influenza season Quality 154 Part B: Falls: Risk Screening (Should Be Reported With Measure 155.): Patient screened for future fall risk; documentation of no falls in the past year or only one fall without injury in the past year Detail Level: Detailed Quality 134: Screening For Clinical Depression And Follow-Up Plan: The patient was screened for depression and the screen was negative and no follow up required Quality 317: Preventative Care And Screening: Screening For High Blood Pressure And Follow-Up Documented: Patient refuses to participate (either BP measurement or follow-up). Quality 47: Advance Care Plan: Advance Care Planning discussed and documented; advance care plan or surrogate decision maker documented in the medical record. Quality 400a: One-Time Screening For Hepatitis C Virus (Hcv) For All Patients: Patient received one-time screening for HCV infection Quality 358: Patient-Centered Surgical Risk Assessment And Communication: Documentation of patient-specific risk assessment with a risk calculator based on multi-institutional clinical data, the specific risk calculator used, and communication of risk assessment from risk calculator with the patient or family. Quality 431: Preventive Care And Screening: Unhealthy Alcohol Use - Screening: Patient screened for unhealthy alcohol use using a single question and scores less than 2 times per year Quality 128: Preventive Care And Screening: Body Mass Index (Bmi) Screening And Follow-Up Plan: BMI not documented, reason not otherwise specified. Quality 131: Pain Assessment And Follow-Up: Pain assessment using a standardized tool is documented as negative, no follow-up plan required Quality 155: Falls Plan Of Care: Plan of Care not Documented, Reason not Otherwise Specified

## (undated) DEVICE — STERILE POLYISOPRENE POWDER-FREE SURGICAL GLOVES: Brand: PROTEXIS

## (undated) DEVICE — MEDIA CONTRAST RX ISOVUE-300 61% 30ML VIALS

## (undated) DEVICE — STANDARD HYPODERMIC NEEDLE,POLYPROPYLENE HUB: Brand: MONOJECT

## (undated) DEVICE — CHLORAPREP 26ML ORANGE

## (undated) DEVICE — NDL,TUOHY EPID,22GX3.5",METAL STYLET: Brand: MEDLINE

## (undated) DEVICE — Device: Brand: JELCO

## (undated) DEVICE — UNIVERSAL BLOCK TRAY: Brand: AVANOS*

## (undated) DEVICE — NEEDLE EPI L3.5IN OD20GA CLR POLYCARB HUB WNG N DEHP TUOHY

## (undated) DEVICE — DISPOSABLE OR TOWEL: Brand: CARDINAL HEALTH

## (undated) DEVICE — SYRINGE MED 3ML CLR PLAS STD N CTRL LUERLOCK TIP DISP

## (undated) DEVICE — PEN: MARKING STD 100/CS: Brand: MEDICAL ACTION INDUSTRIES

## (undated) DEVICE — SET EXTN L7IN PRIMING VOL 0.5ML PRSS RATE STD BOR 1 REM